# Patient Record
Sex: MALE | Race: WHITE
[De-identification: names, ages, dates, MRNs, and addresses within clinical notes are randomized per-mention and may not be internally consistent; named-entity substitution may affect disease eponyms.]

---

## 2020-10-15 VITALS — SYSTOLIC BLOOD PRESSURE: 124 MMHG | DIASTOLIC BLOOD PRESSURE: 90 MMHG

## 2020-10-15 LAB
BASOPHILS NFR BLD AUTO: 1.9 % (ref 0–5)
BUN SERPL-MCNC: 25 MG/DL (ref 7–18)
CHLORIDE SERPL-SCNC: 108 MMOL/L (ref 101–111)
CO2 SERPL-SCNC: 26 MMOL/L (ref 21–32)
CREAT SERPL-MCNC: 1.2 MG/DL (ref 0.5–1.5)
EOSINOPHIL NFR BLD AUTO: 1.9 % (ref 0–8)
ERYTHROCYTE [DISTWIDTH] IN BLOOD BY AUTOMATED COUNT: 21.3 % (ref 11–15.5)
GFR SERPL CREATININE-BSD FRML MDRD: 63 ML/MIN (ref 60–?)
GLUCOSE SERPL-MCNC: 91 MG/DL (ref 70–105)
HCT VFR BLD AUTO: 31.7 % (ref 42–54)
INR PPP: 0.93 (ref 0.85–1.15)
LYMPHOCYTES NFR SPEC AUTO: 34.2 % (ref 21–51)
MCH RBC QN AUTO: 24.7 PG (ref 27–33)
MCHC RBC AUTO-ENTMCNC: 30.6 G/DL (ref 32–36)
MCV RBC AUTO: 80.9 FL (ref 79–99)
MONOCYTES NFR BLD AUTO: 11.9 % (ref 3–13)
NEUTROPHILS NFR BLD AUTO: 49.9 % (ref 40–77)
NRBC BLD MANUAL-RTO: 0 % (ref 0–0.19)
PLATELET # BLD AUTO: 321 K/UL (ref 130–400)
POTASSIUM SERPL-SCNC: 4.2 MMOL/L (ref 3.5–5.1)
PROTHROMBIN TIME: 10.1 SEC (ref 9.6–11.6)
RBC # BLD AUTO: 3.92 MIL/UL (ref 4.5–6.2)
RBC MORPH BLD: (no result)
SODIUM SERPL-SCNC: 145 MMOL/L (ref 136–145)
WBC # BLD AUTO: 5.3 K/UL (ref 4.8–10.8)

## 2020-10-15 NOTE — NUR
report



called dr rodriguez and informed pt refusing covid test and ua.  received new orders to cancel 
ua and ok to obtain rapid covid instead of covid pcr.  dr reilly also notified of pt 
refusal to have covid test done.

## 2020-10-16 NOTE — NUR
RE: ABNORMAL EKG



REPORTED ABNORMAL EKG TO DR COATS, PER DR COATS RECOMMENDS GO TO THE EMERGENCY ROOM. 

CALLED PATIENT AND SPOKE WITH RICA CERVANTES (FRIEND). TOLD HER THAT PATIENT NEEDS TO BE 
TAKEN TO EMERGENCY ROOM DUE TO ABNORMAL EKG. PER, PATIENT'S FRIEND (RICA) PATIENT DRINKS 
HALF A GALLON OF ALCOHOL DAILY AND WILL NOT BE SOBER FOR HIS SURGERY ON MONDAY. SHE WILL 
TAKE HIM TO ER FIRST THING IN THE MORNING.

## 2020-10-16 NOTE — NUR
RE: TACHYCARDIA



CALLED PATIENT'S PROVIDER/ FRIEND, RICA. NO ANSWER BUT LEFT MESSAGE FOR PROVIDER TO MAKE 
SURE PATIENT TOOK HIS METOPROLOL TODAY AND TO GIVE HIM 1 TABLET TONIGHT IF POSSIBLE.

## 2020-10-16 NOTE — NUR
RE: ABNORMAL ABS



REPORTED HGB 9.7, HCT 31.7 TO DR DIOR AND INFORMED HIM THAT PATIENT WAS INSTRUCTED TO GO TO 
ER FOR ABNORMAL EKG. DR DIOR AWARE OF PATIENT'S ALCOHOLISM AND INSTRUCTED TO CALL PATIENT'S 
PROVIDER/FRIEND (RICA) AND INFORM HER TO MAKE SURE PATIENT TOOK HIS METOPROLOL TODAY AND TO 
TAKE 1 TABLET TONIGHT IF POSSIBLE FOR HIS TACHYCARDIA.

## 2020-10-19 ENCOUNTER — HOSPITAL ENCOUNTER (OUTPATIENT)
Dept: HOSPITAL 90 - DAHIP | Age: 70
Setting detail: OBSERVATION
LOS: 1 days | Discharge: HOME | End: 2020-10-20
Attending: ORTHOPAEDIC SURGERY | Admitting: ORTHOPAEDIC SURGERY
Payer: MEDICARE

## 2020-10-19 VITALS — DIASTOLIC BLOOD PRESSURE: 88 MMHG | SYSTOLIC BLOOD PRESSURE: 114 MMHG

## 2020-10-19 VITALS — DIASTOLIC BLOOD PRESSURE: 71 MMHG | SYSTOLIC BLOOD PRESSURE: 101 MMHG

## 2020-10-19 VITALS — SYSTOLIC BLOOD PRESSURE: 117 MMHG | DIASTOLIC BLOOD PRESSURE: 90 MMHG

## 2020-10-19 VITALS — DIASTOLIC BLOOD PRESSURE: 100 MMHG | SYSTOLIC BLOOD PRESSURE: 13 MMHG

## 2020-10-19 VITALS — HEIGHT: 69 IN | BODY MASS INDEX: 26.36 KG/M2 | WEIGHT: 178 LBS

## 2020-10-19 VITALS — SYSTOLIC BLOOD PRESSURE: 105 MMHG | DIASTOLIC BLOOD PRESSURE: 82 MMHG

## 2020-10-19 VITALS — SYSTOLIC BLOOD PRESSURE: 113 MMHG | DIASTOLIC BLOOD PRESSURE: 84 MMHG

## 2020-10-19 VITALS — SYSTOLIC BLOOD PRESSURE: 117 MMHG | DIASTOLIC BLOOD PRESSURE: 82 MMHG

## 2020-10-19 VITALS — DIASTOLIC BLOOD PRESSURE: 80 MMHG | SYSTOLIC BLOOD PRESSURE: 113 MMHG

## 2020-10-19 VITALS — DIASTOLIC BLOOD PRESSURE: 79 MMHG | SYSTOLIC BLOOD PRESSURE: 117 MMHG

## 2020-10-19 VITALS — DIASTOLIC BLOOD PRESSURE: 92 MMHG | SYSTOLIC BLOOD PRESSURE: 121 MMHG

## 2020-10-19 VITALS — DIASTOLIC BLOOD PRESSURE: 83 MMHG | SYSTOLIC BLOOD PRESSURE: 110 MMHG

## 2020-10-19 VITALS — DIASTOLIC BLOOD PRESSURE: 85 MMHG | SYSTOLIC BLOOD PRESSURE: 111 MMHG

## 2020-10-19 VITALS — DIASTOLIC BLOOD PRESSURE: 81 MMHG | SYSTOLIC BLOOD PRESSURE: 110 MMHG

## 2020-10-19 VITALS — DIASTOLIC BLOOD PRESSURE: 64 MMHG | SYSTOLIC BLOOD PRESSURE: 87 MMHG

## 2020-10-19 VITALS — SYSTOLIC BLOOD PRESSURE: 103 MMHG | DIASTOLIC BLOOD PRESSURE: 74 MMHG

## 2020-10-19 VITALS — DIASTOLIC BLOOD PRESSURE: 82 MMHG | SYSTOLIC BLOOD PRESSURE: 110 MMHG

## 2020-10-19 VITALS — SYSTOLIC BLOOD PRESSURE: 77 MMHG | DIASTOLIC BLOOD PRESSURE: 56 MMHG

## 2020-10-19 VITALS — DIASTOLIC BLOOD PRESSURE: 81 MMHG | SYSTOLIC BLOOD PRESSURE: 109 MMHG

## 2020-10-19 VITALS — SYSTOLIC BLOOD PRESSURE: 118 MMHG | DIASTOLIC BLOOD PRESSURE: 88 MMHG

## 2020-10-19 VITALS — DIASTOLIC BLOOD PRESSURE: 83 MMHG | SYSTOLIC BLOOD PRESSURE: 114 MMHG

## 2020-10-19 VITALS — DIASTOLIC BLOOD PRESSURE: 61 MMHG | SYSTOLIC BLOOD PRESSURE: 88 MMHG

## 2020-10-19 VITALS — SYSTOLIC BLOOD PRESSURE: 111 MMHG | DIASTOLIC BLOOD PRESSURE: 78 MMHG

## 2020-10-19 VITALS — SYSTOLIC BLOOD PRESSURE: 108 MMHG | DIASTOLIC BLOOD PRESSURE: 79 MMHG

## 2020-10-19 VITALS — SYSTOLIC BLOOD PRESSURE: 117 MMHG | DIASTOLIC BLOOD PRESSURE: 78 MMHG
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
## 2020-10-19 DIAGNOSIS — S42.491A: Primary | ICD-10-CM
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
## 2020-10-19 PROCEDURE — 24546 OPTX HUM FX W/NTRCNDYLR XTN: CPT

## 2020-10-19 PROCEDURE — 96375 TX/PRO/DX INJ NEW DRUG ADDON: CPT

## 2020-10-19 PROCEDURE — 85025 COMPLETE CBC W/AUTO DIFF WBC: CPT

## 2020-10-19 PROCEDURE — 94664 DEMO&/EVAL PT USE INHALER: CPT

## 2020-10-19 PROCEDURE — 36415 COLL VENOUS BLD VENIPUNCTURE: CPT

## 2020-10-19 PROCEDURE — 73080 X-RAY EXAM OF ELBOW: CPT

## 2020-10-19 PROCEDURE — 85610 PROTHROMBIN TIME: CPT

## 2020-10-19 PROCEDURE — 93005 ELECTROCARDIOGRAM TRACING: CPT

## 2020-10-19 PROCEDURE — 80048 BASIC METABOLIC PNL TOTAL CA: CPT

## 2020-10-19 PROCEDURE — 96374 THER/PROPH/DIAG INJ IV PUSH: CPT

## 2020-10-19 RX ADMIN — CHLORTHALIDONE SCH EACH: 50 TABLET ORAL at 21:00

## 2020-10-19 RX ADMIN — SODIUM CHLORIDE SCH MLS/HR: 0.9 INJECTION, SOLUTION INTRAVENOUS at 19:27

## 2020-10-19 RX ADMIN — FAMOTIDINE SCH MG: 20 TABLET, FILM COATED ORAL at 19:34

## 2020-10-19 RX ADMIN — SODIUM CHLORIDE SCH GM: 9 INJECTION, SOLUTION INTRAVENOUS at 17:30

## 2020-10-19 RX ADMIN — CELECOXIB SCH MG: 200 CAPSULE ORAL at 19:34

## 2020-10-19 RX ADMIN — ACETAMINOPHEN SCH MG: 500 TABLET, COATED ORAL at 19:34

## 2020-10-20 VITALS — SYSTOLIC BLOOD PRESSURE: 108 MMHG | DIASTOLIC BLOOD PRESSURE: 77 MMHG

## 2020-10-20 VITALS — DIASTOLIC BLOOD PRESSURE: 82 MMHG | SYSTOLIC BLOOD PRESSURE: 108 MMHG

## 2020-10-20 RX ADMIN — CHLORTHALIDONE SCH EACH: 50 TABLET ORAL at 09:00

## 2020-10-20 RX ADMIN — OXYCODONE HYDROCHLORIDE PRN MG: 5 TABLET ORAL at 08:33

## 2020-10-20 RX ADMIN — CELECOXIB SCH MG: 200 CAPSULE ORAL at 08:23

## 2020-10-20 RX ADMIN — ACETAMINOPHEN SCH MG: 500 TABLET, COATED ORAL at 09:30

## 2020-10-20 RX ADMIN — SODIUM CHLORIDE SCH MLS/HR: 0.9 INJECTION, SOLUTION INTRAVENOUS at 03:30

## 2020-10-20 RX ADMIN — OXYCODONE HYDROCHLORIDE PRN MG: 5 TABLET ORAL at 11:22

## 2020-10-20 RX ADMIN — FAMOTIDINE SCH MG: 20 TABLET, FILM COATED ORAL at 08:24

## 2020-10-20 RX ADMIN — ACETAMINOPHEN SCH MG: 500 TABLET, COATED ORAL at 00:48

## 2020-10-20 RX ADMIN — SODIUM CHLORIDE SCH GM: 9 INJECTION, SOLUTION INTRAVENOUS at 00:47

## 2020-10-20 NOTE — NUR
NO VOID

PATIENT UNABLE TO VOID BUT REFUSES TO BE BLADDER SCANNED.  PATIENT TOLD RISKS AND BENEFITS 
OF SCANNING AND NOT VOIDING.  HE STILL DID NOT WANT TO BE SCANNED STATES HE WILL VOID ON HIS 
OWN.

## 2020-10-20 NOTE — NUR
DISCHARGE

PATIENT/ELOISA GIVEN DISCHARGE INSTRUCTIONS VIA TEACH BACK. 20G PIV TO LEFT HAND 
DISCONTINUE, TIP INTACT. INSTRUCTIONS GIVEN FOR MEDICATIONS, APPOINTMENTS, EXERCISES TO 
RIGHT ARM, WHEN TO WEAR SLING AND KEEP DRESSING DRY AND INTACT. PATIENT TO FOLLOW UP WITH 
DR. DIOR ON 10/28/20 AT 0930. ELOISA (SANTI) GIVEN INSTRUCTIONS. PATIENT STABLE AT THIS 
TIME AND ANXIOUS TO GO HOME. PATIENT WHEELED TO [facility] BY ETHEL RODRIGUEZ ACCOMPANIED BY 
ELOISA.

## 2020-10-21 ENCOUNTER — HOSPITAL ENCOUNTER (INPATIENT)
Dept: HOSPITAL 90 - EDH | Age: 70
LOS: 4 days | Discharge: HOME HEALTH | DRG: 309 | End: 2020-10-25
Attending: INTERNAL MEDICINE | Admitting: INTERNAL MEDICINE
Payer: MEDICARE

## 2020-10-21 VITALS — WEIGHT: 173.5 LBS | HEIGHT: 70 IN | BODY MASS INDEX: 24.84 KG/M2

## 2020-10-21 VITALS — DIASTOLIC BLOOD PRESSURE: 74 MMHG | SYSTOLIC BLOOD PRESSURE: 110 MMHG

## 2020-10-21 VITALS — DIASTOLIC BLOOD PRESSURE: 85 MMHG | SYSTOLIC BLOOD PRESSURE: 116 MMHG

## 2020-10-21 VITALS — SYSTOLIC BLOOD PRESSURE: 112 MMHG | DIASTOLIC BLOOD PRESSURE: 90 MMHG

## 2020-10-21 VITALS — DIASTOLIC BLOOD PRESSURE: 65 MMHG | SYSTOLIC BLOOD PRESSURE: 145 MMHG

## 2020-10-21 DIAGNOSIS — N17.9: ICD-10-CM

## 2020-10-21 DIAGNOSIS — Z82.49: ICD-10-CM

## 2020-10-21 DIAGNOSIS — R71.0: ICD-10-CM

## 2020-10-21 DIAGNOSIS — I48.4: ICD-10-CM

## 2020-10-21 DIAGNOSIS — Z83.3: ICD-10-CM

## 2020-10-21 DIAGNOSIS — K44.9: ICD-10-CM

## 2020-10-21 DIAGNOSIS — I10: ICD-10-CM

## 2020-10-21 DIAGNOSIS — I48.91: Primary | ICD-10-CM

## 2020-10-21 LAB
ALBUMIN SERPL-MCNC: 3 G/DL (ref 3.5–5)
ALT SERPL-CCNC: 10 U/L (ref 12–78)
AST SERPL-CCNC: 21 U/L (ref 10–37)
BASOPHILS NFR BLD AUTO: 0.8 % (ref 0–5)
BILIRUB SERPL-MCNC: 0.3 MG/DL (ref 0.2–1)
BUN SERPL-MCNC: 35 MG/DL (ref 7–18)
CHLORIDE SERPL-SCNC: 101 MMOL/L (ref 101–111)
CO2 SERPL-SCNC: 22 MMOL/L (ref 21–32)
CREAT SERPL-MCNC: 1.5 MG/DL (ref 0.5–1.5)
EOSINOPHIL NFR BLD AUTO: 0.7 % (ref 0–8)
ERYTHROCYTE [DISTWIDTH] IN BLOOD BY AUTOMATED COUNT: 21.1 % (ref 11–15.5)
GFR SERPL CREATININE-BSD FRML MDRD: 49 ML/MIN (ref 60–?)
GLUCOSE SERPL-MCNC: 106 MG/DL (ref 70–105)
HCT VFR BLD AUTO: 26.8 % (ref 42–54)
LYMPHOCYTES NFR SPEC AUTO: 20.7 % (ref 21–51)
MCH RBC QN AUTO: 24.6 PG (ref 27–33)
MCHC RBC AUTO-ENTMCNC: 30.6 G/DL (ref 32–36)
MCV RBC AUTO: 80.5 FL (ref 79–99)
MONOCYTES NFR BLD AUTO: 10.5 % (ref 3–13)
NEUTROPHILS NFR BLD AUTO: 67 % (ref 40–77)
NRBC BLD MANUAL-RTO: 0 % (ref 0–0.19)
PLATELET # BLD AUTO: 263 K/UL (ref 130–400)
POTASSIUM SERPL-SCNC: 4.1 MMOL/L (ref 3.5–5.1)
PROT SERPL-MCNC: 6.4 G/DL (ref 6–8.3)
RBC # BLD AUTO: 3.33 MIL/UL (ref 4.5–6.2)
SODIUM SERPL-SCNC: 135 MMOL/L (ref 136–145)
WBC # BLD AUTO: 7.4 K/UL (ref 4.8–10.8)

## 2020-10-21 PROCEDURE — 85045 AUTOMATED RETICULOCYTE COUNT: CPT

## 2020-10-21 PROCEDURE — 74176 CT ABD & PELVIS W/O CONTRAST: CPT

## 2020-10-21 PROCEDURE — 93356 MYOCRD STRAIN IMG SPCKL TRCK: CPT

## 2020-10-21 PROCEDURE — 36415 COLL VENOUS BLD VENIPUNCTURE: CPT

## 2020-10-21 PROCEDURE — 80048 BASIC METABOLIC PNL TOTAL CA: CPT

## 2020-10-21 PROCEDURE — 85027 COMPLETE CBC AUTOMATED: CPT

## 2020-10-21 PROCEDURE — 83550 IRON BINDING TEST: CPT

## 2020-10-21 PROCEDURE — 93005 ELECTROCARDIOGRAM TRACING: CPT

## 2020-10-21 PROCEDURE — 82270 OCCULT BLOOD FECES: CPT

## 2020-10-21 PROCEDURE — 80053 COMPREHEN METABOLIC PANEL: CPT

## 2020-10-21 PROCEDURE — 84443 ASSAY THYROID STIM HORMONE: CPT

## 2020-10-21 PROCEDURE — 93306 TTE W/DOPPLER COMPLETE: CPT

## 2020-10-21 PROCEDURE — 85025 COMPLETE CBC W/AUTO DIFF WBC: CPT

## 2020-10-21 PROCEDURE — 83540 ASSAY OF IRON: CPT

## 2020-10-21 RX ADMIN — CHLORTHALIDONE SCH EACH: 50 TABLET ORAL at 21:18

## 2020-10-21 RX ADMIN — HYDROCODONE BITARTRATE AND ACETAMINOPHEN PRN TAB: 5; 325 TABLET ORAL at 21:22

## 2020-10-21 RX ADMIN — Medication SCH TAB: at 21:18

## 2020-10-22 VITALS — SYSTOLIC BLOOD PRESSURE: 147 MMHG | DIASTOLIC BLOOD PRESSURE: 80 MMHG

## 2020-10-22 VITALS — DIASTOLIC BLOOD PRESSURE: 99 MMHG | SYSTOLIC BLOOD PRESSURE: 134 MMHG

## 2020-10-22 VITALS — DIASTOLIC BLOOD PRESSURE: 80 MMHG | SYSTOLIC BLOOD PRESSURE: 130 MMHG

## 2020-10-22 VITALS — SYSTOLIC BLOOD PRESSURE: 140 MMHG | DIASTOLIC BLOOD PRESSURE: 86 MMHG

## 2020-10-22 VITALS — SYSTOLIC BLOOD PRESSURE: 99 MMHG | DIASTOLIC BLOOD PRESSURE: 71 MMHG

## 2020-10-22 VITALS — DIASTOLIC BLOOD PRESSURE: 83 MMHG | SYSTOLIC BLOOD PRESSURE: 123 MMHG

## 2020-10-22 VITALS — SYSTOLIC BLOOD PRESSURE: 140 MMHG | DIASTOLIC BLOOD PRESSURE: 82 MMHG

## 2020-10-22 VITALS — DIASTOLIC BLOOD PRESSURE: 82 MMHG | SYSTOLIC BLOOD PRESSURE: 132 MMHG

## 2020-10-22 VITALS — DIASTOLIC BLOOD PRESSURE: 99 MMHG | SYSTOLIC BLOOD PRESSURE: 128 MMHG

## 2020-10-22 VITALS — SYSTOLIC BLOOD PRESSURE: 113 MMHG | DIASTOLIC BLOOD PRESSURE: 47 MMHG

## 2020-10-22 VITALS — DIASTOLIC BLOOD PRESSURE: 73 MMHG | SYSTOLIC BLOOD PRESSURE: 114 MMHG

## 2020-10-22 VITALS — DIASTOLIC BLOOD PRESSURE: 80 MMHG | SYSTOLIC BLOOD PRESSURE: 121 MMHG

## 2020-10-22 VITALS — SYSTOLIC BLOOD PRESSURE: 106 MMHG | DIASTOLIC BLOOD PRESSURE: 71 MMHG

## 2020-10-22 VITALS — DIASTOLIC BLOOD PRESSURE: 86 MMHG | SYSTOLIC BLOOD PRESSURE: 133 MMHG

## 2020-10-22 VITALS — SYSTOLIC BLOOD PRESSURE: 126 MMHG | DIASTOLIC BLOOD PRESSURE: 77 MMHG

## 2020-10-22 VITALS — SYSTOLIC BLOOD PRESSURE: 117 MMHG | DIASTOLIC BLOOD PRESSURE: 85 MMHG

## 2020-10-22 VITALS — SYSTOLIC BLOOD PRESSURE: 134 MMHG | DIASTOLIC BLOOD PRESSURE: 86 MMHG

## 2020-10-22 VITALS — SYSTOLIC BLOOD PRESSURE: 133 MMHG | DIASTOLIC BLOOD PRESSURE: 90 MMHG

## 2020-10-22 LAB
BUN SERPL-MCNC: 28 MG/DL (ref 7–18)
CHLORIDE SERPL-SCNC: 104 MMOL/L (ref 101–111)
CO2 SERPL-SCNC: 20 MMOL/L (ref 21–32)
CREAT SERPL-MCNC: 1.2 MG/DL (ref 0.5–1.5)
ERYTHROCYTE [DISTWIDTH] IN BLOOD BY AUTOMATED COUNT: 21 % (ref 11–15.5)
GFR SERPL CREATININE-BSD FRML MDRD: 63 ML/MIN (ref 60–?)
GLUCOSE SERPL-MCNC: 99 MG/DL (ref 70–105)
HCT VFR BLD AUTO: 25.5 % (ref 42–54)
IRON SATN MFR SERPL: 7 % (ref 30–44)
IRON SERPL-MCNC: 22 MCG/DL (ref 65–175)
MCH RBC QN AUTO: 24.7 PG (ref 27–33)
MCHC RBC AUTO-ENTMCNC: 30.6 G/DL (ref 32–36)
MCV RBC AUTO: 80.7 FL (ref 79–99)
NRBC BLD MANUAL-RTO: 0 % (ref 0–0.19)
PLATELET # BLD AUTO: 244 K/UL (ref 130–400)
POTASSIUM SERPL-SCNC: 3.7 MMOL/L (ref 3.5–5.1)
RBC # BLD AUTO: 3.16 MIL/UL (ref 4.5–6.2)
RETICS/RBC NFR AUTO: 1.62 % (ref 0.42–2.23)
SODIUM SERPL-SCNC: 137 MMOL/L (ref 136–145)
TIBC SERPL-MCNC: 314 MCG/DL (ref 250–450)
WBC # BLD AUTO: 6.2 K/UL (ref 4.8–10.8)

## 2020-10-22 PROCEDURE — B24BZZ4 ULTRASONOGRAPHY OF HEART WITH AORTA, TRANSESOPHAGEAL: ICD-10-PCS | Performed by: STUDENT IN AN ORGANIZED HEALTH CARE EDUCATION/TRAINING PROGRAM

## 2020-10-22 PROCEDURE — 5A2204Z RESTORATION OF CARDIAC RHYTHM, SINGLE: ICD-10-PCS | Performed by: STUDENT IN AN ORGANIZED HEALTH CARE EDUCATION/TRAINING PROGRAM

## 2020-10-22 RX ADMIN — HYDROCODONE BITARTRATE AND ACETAMINOPHEN PRN TAB: 5; 325 TABLET ORAL at 02:29

## 2020-10-22 RX ADMIN — CHLORTHALIDONE SCH EACH: 50 TABLET ORAL at 09:00

## 2020-10-22 RX ADMIN — CHLORTHALIDONE SCH EACH: 50 TABLET ORAL at 20:52

## 2020-10-22 RX ADMIN — Medication SCH MG: at 20:47

## 2020-10-22 RX ADMIN — HYDROCODONE BITARTRATE AND ACETAMINOPHEN PRN TAB: 5; 325 TABLET ORAL at 20:52

## 2020-10-22 RX ADMIN — ZOLPIDEM TARTRATE PRN MG: 5 TABLET, FILM COATED ORAL at 22:45

## 2020-10-22 RX ADMIN — Medication SCH TAB: at 09:00

## 2020-10-22 RX ADMIN — Medication SCH TAB: at 20:53

## 2020-10-22 NOTE — NUR
ASSESSMENT

ENCOUNTERED PT A&OX3, CALM COOPERATIVE BUT FORGETFUL, DOES NOT APPEAR TO BE IN ANY DISTRESS. 
PT DOES NEED REDIRECTION WITH ACTIVITIES SUCH AS UTILIZING URINAL INSTEAD OF AMBULATING TO 
BATHROOM AND UTILIZING CALL LIGHT FOR ASSISTANCE. PT IS NPO AND CORRECTLY IDENTIFIES 
PROCEDURES PLANNED FOR TODAY. CALL LIGHT WITHIN REACH.

## 2020-10-22 NOTE — NUR
CRISTOPHER & CARDIOVERSION

@ 0900 DR SOLANO, MARGOT CAVAZOS (2 D ECHO TECH) & CHITRA MAURICIO CRNA IN .  PLAN 
FOR CRISTOPHER & CARDIOVERSION W/ANESTHESIA @ BEDSIDE.  PT & ROOM PREPPED & READY FOR PROCEDURE.

@ 1000 PROCEDURE TIME OUT PERFORMED.  PT PLACED ON NRBM, SLIGHT LT LATERAL FOR PROCEDURE.  

@ 1012 ANESTHESIA & PROCEDURE START TIME.  DR SOLANO ATTEMPTED CRISTOPHER.  NO CARDIOVERSION DONE.

PROCEDURE END TIME @ 1015.

PT TOLERATED PROCEDURE WELL.  V/S & RECOVERY AS PER V/S & RECOVERY SPREADSHEET.

## 2020-10-23 VITALS — SYSTOLIC BLOOD PRESSURE: 119 MMHG | DIASTOLIC BLOOD PRESSURE: 73 MMHG

## 2020-10-23 VITALS — DIASTOLIC BLOOD PRESSURE: 92 MMHG | SYSTOLIC BLOOD PRESSURE: 131 MMHG

## 2020-10-23 VITALS — DIASTOLIC BLOOD PRESSURE: 85 MMHG | SYSTOLIC BLOOD PRESSURE: 124 MMHG

## 2020-10-23 VITALS — DIASTOLIC BLOOD PRESSURE: 71 MMHG | SYSTOLIC BLOOD PRESSURE: 105 MMHG

## 2020-10-23 VITALS — SYSTOLIC BLOOD PRESSURE: 123 MMHG | DIASTOLIC BLOOD PRESSURE: 83 MMHG

## 2020-10-23 VITALS — DIASTOLIC BLOOD PRESSURE: 86 MMHG | SYSTOLIC BLOOD PRESSURE: 119 MMHG

## 2020-10-23 LAB
ALBUMIN SERPL-MCNC: 2.7 G/DL (ref 3.5–5)
ALT SERPL-CCNC: 16 U/L (ref 12–78)
AST SERPL-CCNC: 23 U/L (ref 10–37)
BASOPHILS NFR BLD AUTO: 0.7 % (ref 0–5)
BILIRUB SERPL-MCNC: 0.4 MG/DL (ref 0.2–1)
BUN SERPL-MCNC: 19 MG/DL (ref 7–18)
CHLORIDE SERPL-SCNC: 104 MMOL/L (ref 101–111)
CO2 SERPL-SCNC: 19 MMOL/L (ref 21–32)
CREAT SERPL-MCNC: 0.8 MG/DL (ref 0.5–1.5)
EOSINOPHIL NFR BLD AUTO: 1.4 % (ref 0–8)
ERYTHROCYTE [DISTWIDTH] IN BLOOD BY AUTOMATED COUNT: 21 % (ref 11–15.5)
GFR SERPL CREATININE-BSD FRML MDRD: 101 ML/MIN (ref 60–?)
GLUCOSE SERPL-MCNC: 92 MG/DL (ref 70–105)
HCT VFR BLD AUTO: 25.1 % (ref 42–54)
LYMPHOCYTES NFR SPEC AUTO: 22 % (ref 21–51)
MCH RBC QN AUTO: 24.9 PG (ref 27–33)
MCHC RBC AUTO-ENTMCNC: 31.1 G/DL (ref 32–36)
MCV RBC AUTO: 80.2 FL (ref 79–99)
MONOCYTES NFR BLD AUTO: 14.2 % (ref 3–13)
NEUTROPHILS NFR BLD AUTO: 61.5 % (ref 40–77)
NRBC BLD MANUAL-RTO: 0 % (ref 0–0.19)
PLATELET # BLD AUTO: 242 K/UL (ref 130–400)
POTASSIUM SERPL-SCNC: 3.5 MMOL/L (ref 3.5–5.1)
PROT SERPL-MCNC: 6.1 G/DL (ref 6–8.3)
RBC # BLD AUTO: 3.13 MIL/UL (ref 4.5–6.2)
SODIUM SERPL-SCNC: 139 MMOL/L (ref 136–145)
WBC # BLD AUTO: 5.8 K/UL (ref 4.8–10.8)

## 2020-10-23 RX ADMIN — CHLORTHALIDONE SCH EACH: 50 TABLET ORAL at 20:48

## 2020-10-23 RX ADMIN — Medication SCH MG: at 20:47

## 2020-10-23 RX ADMIN — HYDROCODONE BITARTRATE AND ACETAMINOPHEN PRN TAB: 5; 325 TABLET ORAL at 18:19

## 2020-10-23 RX ADMIN — Medication SCH MG: at 08:55

## 2020-10-23 RX ADMIN — HYDROCODONE BITARTRATE AND ACETAMINOPHEN PRN TAB: 5; 325 TABLET ORAL at 18:25

## 2020-10-23 RX ADMIN — Medication SCH TAB: at 20:47

## 2020-10-23 RX ADMIN — HYDROCODONE BITARTRATE AND ACETAMINOPHEN PRN TAB: 5; 325 TABLET ORAL at 10:56

## 2020-10-23 RX ADMIN — Medication SCH MG: at 14:20

## 2020-10-23 RX ADMIN — Medication SCH MG: at 22:48

## 2020-10-23 RX ADMIN — ZOLPIDEM TARTRATE PRN MG: 5 TABLET, FILM COATED ORAL at 20:47

## 2020-10-23 RX ADMIN — CHLORTHALIDONE SCH EACH: 50 TABLET ORAL at 08:51

## 2020-10-23 RX ADMIN — Medication SCH MG: at 13:37

## 2020-10-23 RX ADMIN — Medication SCH TAB: at 08:55

## 2020-10-23 NOTE — NUR
PATIENT TIMES THREE HAS TURNED OFF CARDIZEM PUMP.  WHEN ASKING PATIENT WHY HE STATED I DONT 
WANT IT.  IMPORTANCE OF MEDICATION EXPLAINED TO PATIENT AND HE STATED HE DIDN'T CARE.  WILL 
CONTINUE TO MONITOR PUMP.

## 2020-10-23 NOTE — NUR
CARDIZEM DRIP

CARDIZEM DRIP WEANED OFF AS ORDERED PER DR AVINA.  HEART RATE 70-90, DENIES CHEST PAIN OR 
DISCOMFORT.

## 2020-10-23 NOTE — NUR
AM ASSESSMENT

PT AWAKE AND ALERT, STATES GOING HOME TODAY AMA IF NOT DISCHARGED.  MADE PT AWARE HEART RATE 
ELEVATED AND NEEDS TO HAVE RATE CONTROL  PT INSISTS ON GOING HOME.  CONTINUES ON JAC LOPEZ MD TO BE MADE AWARE OF ELEVATED RATE

## 2020-10-23 NOTE — NUR
PT MORE CALM, HEART RATE 100-110, CARDIZEM AT 15 CC/H.  REMINDED NOT TO GET OUT OF BED PER 
SELF, CALL LIGHT WITHIN REACH.

## 2020-10-23 NOTE — NUR
PATIENT PULLED IV OUT CONNECTED TO THE CARDIZEM.  WHEN ASKED WHY, PATIENT STATED HE WANTED 
TO AND THAT HE WAS A BAD PATIENT.  CARDIZEM RECONNECTED TO OTHER EXISTING IV AND AGAIN THE 
NEED FOR CARDIZEM WAS EXPLAINED TO PATIENT.

## 2020-10-23 NOTE — NUR
Spoke to patient's nuryse (Rachel Metty [phone]). As per meredith, patient lives alone, 
has no DME or home services, is independent with ADLs before this admission, and she will be 
assisting with transport upon discharge. Meredith states patient will be staying at her 
residence after discharge. CM to follow up.

## 2020-10-24 VITALS — SYSTOLIC BLOOD PRESSURE: 120 MMHG | DIASTOLIC BLOOD PRESSURE: 73 MMHG

## 2020-10-24 VITALS — SYSTOLIC BLOOD PRESSURE: 131 MMHG | DIASTOLIC BLOOD PRESSURE: 96 MMHG

## 2020-10-24 VITALS — DIASTOLIC BLOOD PRESSURE: 50 MMHG | SYSTOLIC BLOOD PRESSURE: 70 MMHG

## 2020-10-24 VITALS — DIASTOLIC BLOOD PRESSURE: 101 MMHG | SYSTOLIC BLOOD PRESSURE: 128 MMHG

## 2020-10-24 VITALS — SYSTOLIC BLOOD PRESSURE: 130 MMHG | DIASTOLIC BLOOD PRESSURE: 94 MMHG

## 2020-10-24 VITALS — DIASTOLIC BLOOD PRESSURE: 86 MMHG | SYSTOLIC BLOOD PRESSURE: 130 MMHG

## 2020-10-24 LAB
BASOPHILS NFR BLD AUTO: 0.8 % (ref 0–5)
BUN SERPL-MCNC: 18 MG/DL (ref 7–18)
CHLORIDE SERPL-SCNC: 104 MMOL/L (ref 101–111)
CO2 SERPL-SCNC: 21 MMOL/L (ref 21–32)
CREAT SERPL-MCNC: 1 MG/DL (ref 0.5–1.5)
EOSINOPHIL NFR BLD AUTO: 0.8 % (ref 0–8)
ERYTHROCYTE [DISTWIDTH] IN BLOOD BY AUTOMATED COUNT: 20.8 % (ref 11–15.5)
GFR SERPL CREATININE-BSD FRML MDRD: 78 ML/MIN (ref 60–?)
GLUCOSE SERPL-MCNC: 92 MG/DL (ref 70–105)
HCT VFR BLD AUTO: 25 % (ref 42–54)
LYMPHOCYTES NFR SPEC AUTO: 22.4 % (ref 21–51)
MCH RBC QN AUTO: 24.4 PG (ref 27–33)
MCHC RBC AUTO-ENTMCNC: 30 G/DL (ref 32–36)
MCV RBC AUTO: 81.2 FL (ref 79–99)
MONOCYTES NFR BLD AUTO: 16.8 % (ref 3–13)
NEUTROPHILS NFR BLD AUTO: 58.9 % (ref 40–77)
NRBC BLD MANUAL-RTO: 0 % (ref 0–0.19)
PLATELET # BLD AUTO: 283 K/UL (ref 130–400)
POTASSIUM SERPL-SCNC: 3.5 MMOL/L (ref 3.5–5.1)
RBC # BLD AUTO: 3.08 MIL/UL (ref 4.5–6.2)
SODIUM SERPL-SCNC: 139 MMOL/L (ref 136–145)
WBC # BLD AUTO: 6 K/UL (ref 4.8–10.8)

## 2020-10-24 RX ADMIN — Medication SCH MG: at 05:27

## 2020-10-24 RX ADMIN — CHLORTHALIDONE SCH EACH: 50 TABLET ORAL at 08:28

## 2020-10-24 RX ADMIN — APIXABAN SCH MG: 5 TABLET, FILM COATED ORAL at 08:19

## 2020-10-24 RX ADMIN — Medication SCH MG: at 13:40

## 2020-10-24 RX ADMIN — HYDROCODONE BITARTRATE AND ACETAMINOPHEN PRN TAB: 5; 325 TABLET ORAL at 19:15

## 2020-10-24 RX ADMIN — Medication SCH TAB: at 21:54

## 2020-10-24 RX ADMIN — Medication SCH MG: at 21:54

## 2020-10-24 RX ADMIN — Medication SCH TAB: at 08:18

## 2020-10-24 RX ADMIN — CHLORTHALIDONE SCH EACH: 50 TABLET ORAL at 21:00

## 2020-10-24 RX ADMIN — ZOLPIDEM TARTRATE PRN MG: 5 TABLET, FILM COATED ORAL at 23:41

## 2020-10-24 RX ADMIN — APIXABAN SCH MG: 5 TABLET, FILM COATED ORAL at 21:55

## 2020-10-24 RX ADMIN — Medication SCH MG: at 08:18

## 2020-10-24 NOTE — NUR
ATTEMPTED DISCHARGE 

Attempted to discharge patient based on Aishwarya SO'S orders. Daphney Melgar had concerns 
about patient's self care at home due to declining hemoglobin levels, fluctuating heart rate 
and activities of daily living due to right arm fracture. Bety also states that patient is 
forgetful and wouldn't be able to self administer his medications if sent home for self 
care. Bety insists on speaking with NP. 



Bety's concerns relayed to Aishwarya SO. NP states to hold discharge and will have a meeting 
with patient and bety tomorrow.

## 2020-10-25 VITALS — DIASTOLIC BLOOD PRESSURE: 94 MMHG | SYSTOLIC BLOOD PRESSURE: 125 MMHG

## 2020-10-25 VITALS — SYSTOLIC BLOOD PRESSURE: 93 MMHG | DIASTOLIC BLOOD PRESSURE: 55 MMHG

## 2020-10-25 VITALS — SYSTOLIC BLOOD PRESSURE: 139 MMHG | DIASTOLIC BLOOD PRESSURE: 87 MMHG

## 2020-10-25 VITALS — DIASTOLIC BLOOD PRESSURE: 100 MMHG | SYSTOLIC BLOOD PRESSURE: 141 MMHG

## 2020-10-25 VITALS — DIASTOLIC BLOOD PRESSURE: 80 MMHG | SYSTOLIC BLOOD PRESSURE: 130 MMHG

## 2020-10-25 LAB
BASOPHILS NFR BLD AUTO: 0.7 % (ref 0–5)
BUN SERPL-MCNC: 30 MG/DL (ref 7–18)
CHLORIDE SERPL-SCNC: 100 MMOL/L (ref 101–111)
CO2 SERPL-SCNC: 21 MMOL/L (ref 21–32)
CREAT SERPL-MCNC: 2.1 MG/DL (ref 0.5–1.5)
EOSINOPHIL NFR BLD AUTO: 0.5 % (ref 0–8)
ERYTHROCYTE [DISTWIDTH] IN BLOOD BY AUTOMATED COUNT: 20.9 % (ref 11–15.5)
GFR SERPL CREATININE-BSD FRML MDRD: 33 ML/MIN (ref 60–?)
GLUCOSE SERPL-MCNC: 118 MG/DL (ref 70–105)
HCT VFR BLD AUTO: 26.9 % (ref 42–54)
LYMPHOCYTES NFR SPEC AUTO: 21.6 % (ref 21–51)
MCH RBC QN AUTO: 24.8 PG (ref 27–33)
MCHC RBC AUTO-ENTMCNC: 30.5 G/DL (ref 32–36)
MCV RBC AUTO: 81.3 FL (ref 79–99)
MONOCYTES NFR BLD AUTO: 19.5 % (ref 3–13)
NEUTROPHILS NFR BLD AUTO: 57.6 % (ref 40–77)
NRBC BLD MANUAL-RTO: 0 % (ref 0–0.19)
PLATELET # BLD AUTO: 333 K/UL (ref 130–400)
POTASSIUM SERPL-SCNC: 4.1 MMOL/L (ref 3.5–5.1)
RBC # BLD AUTO: 3.31 MIL/UL (ref 4.5–6.2)
RBC MORPH BLD: (no result)
SODIUM SERPL-SCNC: 135 MMOL/L (ref 136–145)
WBC # BLD AUTO: 8.2 K/UL (ref 4.8–10.8)

## 2020-10-25 RX ADMIN — HYDROCODONE BITARTRATE AND ACETAMINOPHEN PRN TAB: 5; 325 TABLET ORAL at 08:36

## 2020-10-25 RX ADMIN — CHLORTHALIDONE SCH EACH: 50 TABLET ORAL at 08:37

## 2020-10-25 RX ADMIN — Medication SCH MG: at 14:32

## 2020-10-25 RX ADMIN — Medication SCH TAB: at 08:35

## 2020-10-25 RX ADMIN — Medication SCH MG: at 08:35

## 2020-10-25 RX ADMIN — Medication SCH MG: at 06:14

## 2020-10-25 NOTE — NUR
Patient alert and oriented to name, , and month but has moments of forgetfulness.  
Continues to ask same questions after they were answered within the last 5 min. Patient 
resting in bed. Sling was placed to the R arm.  Patient non compliant with sling. Patient R 
fingers pink in color and warm to touch. No c/o pain throughout the night.

## 2020-10-25 NOTE — NUR
CM NOTE

CM followed up with patient and bety in regards to discharge plan. CM offered short term 
SNF/rehab as possible d/c option. Patient declined placement at this time. Bety and 
patient discussed possibly arranging home health for medication administration assistance. 
Patient agreeable to plan. CM explained to patient and bety Ramos that orders will be 
needing to be signed by patient's PCP. Verbalized understanding. Patient gave CM verbal 
consent to speak to PCP office and also signed ELADIO to fax medical records in preparation of 
home health. CM spoke to [facility] with Dr. Quinteros after obtaining consent from 
patient. CM explained need for home health. CM received fax number to fax records. Devaughn LOPEZ agreed to order home health. States he will be speaking to patient and will see in 
office tomorrow. Bety Ramos agreed to assist patient with medication management while 
home health is being arranged. No other needs verbalized or identified. CM to fax clinical 
as requested. Nursing updated with above.

-------------------------------------------------------------------------------

Addendum: 10/25/20 at 1521 by VAMSI HERNADEZ 

-------------------------------------------------------------------------------

Amended: Links added.

## 2020-11-10 ENCOUNTER — HOSPITAL ENCOUNTER (INPATIENT)
Dept: HOSPITAL 90 - EDH | Age: 70
LOS: 7 days | Discharge: NURSING, REHAB OR LONG-TERM CARE | DRG: 501 | End: 2020-11-17
Attending: ORTHOPAEDIC SURGERY | Admitting: ORTHOPAEDIC SURGERY
Payer: MEDICARE

## 2020-11-10 VITALS — BODY MASS INDEX: 25.34 KG/M2 | HEIGHT: 70 IN | WEIGHT: 177 LBS

## 2020-11-10 VITALS — DIASTOLIC BLOOD PRESSURE: 76 MMHG | SYSTOLIC BLOOD PRESSURE: 122 MMHG

## 2020-11-10 DIAGNOSIS — I47.1: ICD-10-CM
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
## 2020-11-10 DIAGNOSIS — I12.9: ICD-10-CM

## 2020-11-10 DIAGNOSIS — D68.69: ICD-10-CM

## 2020-11-10 DIAGNOSIS — A49.01: ICD-10-CM

## 2020-11-10 DIAGNOSIS — N18.31: ICD-10-CM

## 2020-11-10 DIAGNOSIS — Z91.19: ICD-10-CM

## 2020-11-10 DIAGNOSIS — I48.20: ICD-10-CM
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
## 2020-11-10 DIAGNOSIS — Z79.01: ICD-10-CM
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
## 2020-11-10 DIAGNOSIS — I48.4: ICD-10-CM

## 2020-11-10 DIAGNOSIS — Z83.3: ICD-10-CM

## 2020-11-10 DIAGNOSIS — G62.9: ICD-10-CM
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
## 2020-11-10 DIAGNOSIS — R53.81: ICD-10-CM

## 2020-11-10 DIAGNOSIS — R29.6: ICD-10-CM

## 2020-11-10 DIAGNOSIS — D50.9: ICD-10-CM

## 2020-11-10 DIAGNOSIS — I48.91: ICD-10-CM

## 2020-11-10 DIAGNOSIS — Z91.81: ICD-10-CM

## 2020-11-10 DIAGNOSIS — T81.31XA: ICD-10-CM

## 2020-11-10 DIAGNOSIS — Z20.828: ICD-10-CM

## 2020-11-10 DIAGNOSIS — L03.90: ICD-10-CM

## 2020-11-10 DIAGNOSIS — D62: ICD-10-CM

## 2020-11-10 DIAGNOSIS — F10.239: ICD-10-CM

## 2020-11-10 DIAGNOSIS — T84.612A: Primary | ICD-10-CM

## 2020-11-10 LAB
BASOPHILS NFR BLD AUTO: 0.6 % (ref 0–5)
BUN SERPL-MCNC: 22 MG/DL (ref 7–18)
CHLORIDE SERPL-SCNC: 103 MMOL/L (ref 101–111)
CO2 SERPL-SCNC: 18 MMOL/L (ref 21–32)
CREAT SERPL-MCNC: 1.4 MG/DL (ref 0.5–1.5)
EOSINOPHIL NFR BLD AUTO: 1.9 % (ref 0–8)
ERYTHROCYTE [DISTWIDTH] IN BLOOD BY AUTOMATED COUNT: 19.3 % (ref 11–15.5)
GFR SERPL CREATININE-BSD FRML MDRD: 53 ML/MIN (ref 60–?)
GLUCOSE SERPL-MCNC: 93 MG/DL (ref 70–105)
HCT VFR BLD AUTO: 23.5 % (ref 42–54)
LYMPHOCYTES NFR SPEC AUTO: 10.9 % (ref 21–51)
MCH RBC QN AUTO: 24.1 PG (ref 27–33)
MCHC RBC AUTO-ENTMCNC: 30.2 G/DL (ref 32–36)
MCV RBC AUTO: 79.7 FL (ref 79–99)
MONOCYTES NFR BLD AUTO: 12.4 % (ref 3–13)
NEUTROPHILS NFR BLD AUTO: 73.8 % (ref 40–77)
NRBC BLD MANUAL-RTO: 0 % (ref 0–0.19)
PLATELET # BLD AUTO: 296 K/UL (ref 130–400)
POTASSIUM SERPL-SCNC: 4.7 MMOL/L (ref 3.5–5.1)
RBC # BLD AUTO: 2.95 MIL/UL (ref 4.5–6.2)
RBC MORPH BLD: (no result)
SODIUM SERPL-SCNC: 137 MMOL/L (ref 136–145)
WBC # BLD AUTO: 7.9 K/UL (ref 4.8–10.8)

## 2020-11-10 PROCEDURE — 87205 SMEAR GRAM STAIN: CPT

## 2020-11-10 PROCEDURE — 85730 THROMBOPLASTIN TIME PARTIAL: CPT

## 2020-11-10 PROCEDURE — 87077 CULTURE AEROBIC IDENTIFY: CPT

## 2020-11-10 PROCEDURE — 82746 ASSAY OF FOLIC ACID SERUM: CPT

## 2020-11-10 PROCEDURE — 85610 PROTHROMBIN TIME: CPT

## 2020-11-10 PROCEDURE — 85014 HEMATOCRIT: CPT

## 2020-11-10 PROCEDURE — 36415 COLL VENOUS BLD VENIPUNCTURE: CPT

## 2020-11-10 PROCEDURE — 80202 ASSAY OF VANCOMYCIN: CPT

## 2020-11-10 PROCEDURE — 87186 SC STD MICRODIL/AGAR DIL: CPT

## 2020-11-10 PROCEDURE — 85018 HEMOGLOBIN: CPT

## 2020-11-10 PROCEDURE — 83735 ASSAY OF MAGNESIUM: CPT

## 2020-11-10 PROCEDURE — 85027 COMPLETE CBC AUTOMATED: CPT

## 2020-11-10 PROCEDURE — 80076 HEPATIC FUNCTION PANEL: CPT

## 2020-11-10 PROCEDURE — 86923 COMPATIBILITY TEST ELECTRIC: CPT

## 2020-11-10 PROCEDURE — 87070 CULTURE OTHR SPECIMN AEROBIC: CPT

## 2020-11-10 PROCEDURE — 73080 X-RAY EXAM OF ELBOW: CPT

## 2020-11-10 PROCEDURE — 84443 ASSAY THYROID STIM HORMONE: CPT

## 2020-11-10 PROCEDURE — 82948 REAGENT STRIP/BLOOD GLUCOSE: CPT

## 2020-11-10 PROCEDURE — 84100 ASSAY OF PHOSPHORUS: CPT

## 2020-11-10 PROCEDURE — 86850 RBC ANTIBODY SCREEN: CPT

## 2020-11-10 PROCEDURE — 82607 VITAMIN B-12: CPT

## 2020-11-10 PROCEDURE — 87426 SARSCOV CORONAVIRUS AG IA: CPT

## 2020-11-10 PROCEDURE — 82140 ASSAY OF AMMONIA: CPT

## 2020-11-10 PROCEDURE — 85025 COMPLETE CBC W/AUTO DIFF WBC: CPT

## 2020-11-10 PROCEDURE — 80048 BASIC METABOLIC PNL TOTAL CA: CPT

## 2020-11-10 PROCEDURE — 86901 BLOOD TYPING SEROLOGIC RH(D): CPT

## 2020-11-10 PROCEDURE — 70450 CT HEAD/BRAIN W/O DYE: CPT

## 2020-11-10 PROCEDURE — 36430 TRANSFUSION BLD/BLD COMPNT: CPT

## 2020-11-10 PROCEDURE — 87076 CULTURE ANAEROBE IDENT EACH: CPT

## 2020-11-10 PROCEDURE — 93005 ELECTROCARDIOGRAM TRACING: CPT

## 2020-11-10 PROCEDURE — 86900 BLOOD TYPING SEROLOGIC ABO: CPT

## 2020-11-10 NOTE — NUR
SPOKE WITH SANTI [place], PATIENT'S , SHE WILL BRING ALL THE LIST OF HOME 
MEDICATIONS  OF THE PATIENT TOMORROW 11/09/2020.

## 2020-11-10 NOTE — NUR
JOSE NOTE/IA

UNABLE TO MET WITH PATIENT AT BEDSIDE. NEXT OF KIN CALLED, SANTI [place]. AS PER SANTI, SHE 
IS A GOOD FRIEND TO PATIENT AND NO LONGER HIS FIANCEE DUE TO HIS DRINKING PROBLEMS, WEDDING 
WAS CALLED OFF. AS PER SANTI, PATIENT LIVES ALONE, IS SEMI INDEPENDENT, HAS NO DME, UNABLE 
TO DRIVE AND RELIES ON HIS FRIENDS FOR RIDES. AS PER SANTI, DOES NOT FEEL LIKE PATIENT 
SHOULD LIKE ALONE, STATES HE WAS HAD "WAY TOO MANY FALLS WHERE HE HURTS HIMSELF" AND " HE 
CANT DO MUCH FOR HIMSELF ANYMORE". AS PER SANTI, STATES THAT LAST TIME HE WAS IN HOSPITAL, 
"HOME HEALTH WAS OFFERED TO HIM AS PART OF HIS SURGERY BUT HE REFUSED IT AND THAT IS WHY 
WOUND IS INFECTED AND NOT HEALING." PATIENT IS PENDING POSSIBLE SURGERY FOR WOUND DEHISCENCE 
WITH DR. DIOR. CM TO FOLLOW UP.  

-------------------------------------------------------------------------------

Addendum: 11/10/20 at 1605 by FELIX ENRIQUEZ RN CM

-------------------------------------------------------------------------------

Amended: Links added.

## 2020-11-10 NOTE — NUR
CORRECTION TO THE NOTE BELOW. SANTI, SAID SHE WILL BRING ALL THE LIST FO MEDICATIONS 
TOMORROW 11/11/2020 AS SHE CANNOT COME IN TONIGHT AND DROP IT IN THE HOSPITAL. CHARGE NURSE 
SAMUEL ALSO HAS BEEN NOTIFIED.

## 2020-11-11 VITALS — DIASTOLIC BLOOD PRESSURE: 76 MMHG | SYSTOLIC BLOOD PRESSURE: 102 MMHG

## 2020-11-11 VITALS — DIASTOLIC BLOOD PRESSURE: 77 MMHG | SYSTOLIC BLOOD PRESSURE: 113 MMHG

## 2020-11-11 VITALS — DIASTOLIC BLOOD PRESSURE: 69 MMHG | SYSTOLIC BLOOD PRESSURE: 129 MMHG

## 2020-11-11 VITALS — DIASTOLIC BLOOD PRESSURE: 76 MMHG | SYSTOLIC BLOOD PRESSURE: 124 MMHG

## 2020-11-11 VITALS — SYSTOLIC BLOOD PRESSURE: 111 MMHG | DIASTOLIC BLOOD PRESSURE: 66 MMHG

## 2020-11-11 VITALS — SYSTOLIC BLOOD PRESSURE: 114 MMHG | DIASTOLIC BLOOD PRESSURE: 75 MMHG

## 2020-11-11 VITALS — DIASTOLIC BLOOD PRESSURE: 77 MMHG | SYSTOLIC BLOOD PRESSURE: 111 MMHG

## 2020-11-11 VITALS — DIASTOLIC BLOOD PRESSURE: 72 MMHG | SYSTOLIC BLOOD PRESSURE: 114 MMHG

## 2020-11-11 VITALS — DIASTOLIC BLOOD PRESSURE: 80 MMHG | SYSTOLIC BLOOD PRESSURE: 115 MMHG

## 2020-11-11 VITALS — DIASTOLIC BLOOD PRESSURE: 72 MMHG | SYSTOLIC BLOOD PRESSURE: 107 MMHG

## 2020-11-11 VITALS — SYSTOLIC BLOOD PRESSURE: 95 MMHG | DIASTOLIC BLOOD PRESSURE: 77 MMHG

## 2020-11-11 VITALS — DIASTOLIC BLOOD PRESSURE: 75 MMHG | SYSTOLIC BLOOD PRESSURE: 112 MMHG

## 2020-11-11 VITALS — SYSTOLIC BLOOD PRESSURE: 131 MMHG | DIASTOLIC BLOOD PRESSURE: 73 MMHG

## 2020-11-11 VITALS — SYSTOLIC BLOOD PRESSURE: 106 MMHG | DIASTOLIC BLOOD PRESSURE: 71 MMHG

## 2020-11-11 VITALS — SYSTOLIC BLOOD PRESSURE: 116 MMHG | DIASTOLIC BLOOD PRESSURE: 76 MMHG

## 2020-11-11 VITALS — SYSTOLIC BLOOD PRESSURE: 112 MMHG | DIASTOLIC BLOOD PRESSURE: 73 MMHG

## 2020-11-11 VITALS — SYSTOLIC BLOOD PRESSURE: 96 MMHG | DIASTOLIC BLOOD PRESSURE: 70 MMHG

## 2020-11-11 VITALS — SYSTOLIC BLOOD PRESSURE: 115 MMHG | DIASTOLIC BLOOD PRESSURE: 77 MMHG

## 2020-11-11 VITALS — SYSTOLIC BLOOD PRESSURE: 126 MMHG | DIASTOLIC BLOOD PRESSURE: 85 MMHG

## 2020-11-11 VITALS — SYSTOLIC BLOOD PRESSURE: 118 MMHG | DIASTOLIC BLOOD PRESSURE: 77 MMHG

## 2020-11-11 VITALS — DIASTOLIC BLOOD PRESSURE: 70 MMHG | SYSTOLIC BLOOD PRESSURE: 109 MMHG

## 2020-11-11 PROCEDURE — 3E0T33Z INTRODUCTION OF ANTI-INFLAMMATORY INTO PERIPHERAL NERVES AND PLEXI, PERCUTANEOUS APPROACH: ICD-10-PCS | Performed by: ORTHOPAEDIC SURGERY

## 2020-11-11 PROCEDURE — 0RPL04Z REMOVAL OF INTERNAL FIXATION DEVICE FROM RIGHT ELBOW JOINT, OPEN APPROACH: ICD-10-PCS | Performed by: ORTHOPAEDIC SURGERY

## 2020-11-11 PROCEDURE — 0XBB0ZZ EXCISION OF RIGHT ELBOW REGION, OPEN APPROACH: ICD-10-PCS | Performed by: ORTHOPAEDIC SURGERY

## 2020-11-11 PROCEDURE — 3E0T3BZ INTRODUCTION OF ANESTHETIC AGENT INTO PERIPHERAL NERVES AND PLEXI, PERCUTANEOUS APPROACH: ICD-10-PCS | Performed by: ORTHOPAEDIC SURGERY

## 2020-11-11 PROCEDURE — 0RHL08Z INSERTION OF SPACER INTO RIGHT ELBOW JOINT, OPEN APPROACH: ICD-10-PCS | Performed by: ORTHOPAEDIC SURGERY

## 2020-11-11 RX ADMIN — Medication SCH MG: at 21:00

## 2020-11-11 RX ADMIN — CLINDAMYCIN PHOSPHATE SCH MLS/HR: 18 INJECTION, SOLUTION INTRAVENOUS at 08:28

## 2020-11-11 RX ADMIN — CLINDAMYCIN PHOSPHATE SCH MLS/HR: 18 INJECTION, SOLUTION INTRAVENOUS at 14:02

## 2020-11-11 RX ADMIN — VANCOMYCIN HYDROCHLORIDE SCH MLS/HR: 1 INJECTION, POWDER, LYOPHILIZED, FOR SOLUTION INTRAVENOUS at 21:00

## 2020-11-11 RX ADMIN — CEFTAZIDIME SCH GM: 170 INJECTION, POWDER, FOR SOLUTION INTRAMUSCULAR; INTRAVENOUS at 16:10

## 2020-11-11 RX ADMIN — CEFTAZIDIME SCH GM: 170 INJECTION, POWDER, FOR SOLUTION INTRAMUSCULAR; INTRAVENOUS at 09:53

## 2020-11-11 RX ADMIN — CHLORTHALIDONE SCH EACH: 50 TABLET ORAL at 21:00

## 2020-11-11 RX ADMIN — SODIUM CHLORIDE SCH MLS/HR: 0.9 INJECTION, SOLUTION INTRAVENOUS at 23:40

## 2020-11-11 RX ADMIN — SODIUM CHLORIDE SCH MLS/HR: 0.9 INJECTION, SOLUTION INTRAVENOUS at 14:25

## 2020-11-11 RX ADMIN — CLINDAMYCIN PHOSPHATE SCH MLS/HR: 18 INJECTION, SOLUTION INTRAVENOUS at 23:25

## 2020-11-11 RX ADMIN — Medication SCH MG: at 09:52

## 2020-11-11 RX ADMIN — HYDROCODONE BITARTRATE AND ACETAMINOPHEN PRN TAB: 5; 325 TABLET ORAL at 23:24

## 2020-11-11 RX ADMIN — CHLORTHALIDONE SCH EACH: 50 TABLET ORAL at 09:00

## 2020-11-11 RX ADMIN — VANCOMYCIN HYDROCHLORIDE SCH MLS/HR: 1 INJECTION, POWDER, LYOPHILIZED, FOR SOLUTION INTRAVENOUS at 09:53

## 2020-11-11 RX ADMIN — VANCOMYCIN HYDROCHLORIDE SCH MLS/HR: 1 INJECTION, POWDER, LYOPHILIZED, FOR SOLUTION INTRAVENOUS at 05:31

## 2020-11-11 RX ADMIN — SODIUM CHLORIDE SCH MLS/HR: 0.9 INJECTION, SOLUTION INTRAVENOUS at 01:03

## 2020-11-11 NOTE — NUR
PT SENT TO SURGERY,  PT STILL IN NEED OF 1 PRBC OF BLOOD AND THAT WAS REPORTED TO SURGERY 
HOLDING AREA NURSES.

## 2020-11-11 NOTE — NUR
MD FRNAK MARIE AT BEDSIDE WITH THE PATIENT. AFTER THOROUGH ASSESSMENT AND HISTORY TAKING, MD MARIE 
SAID HE WILL CLEAR PATIENT FOR SURGERY. MD MARIE NOTED HGB WAS LOW LAST NIGHT. HE ORDERED 
STOOL OCCULT, THAT WAS PENDING. INFORMED HUMBERTO PATRICIA ABOUT IT.

PATIENT ALERT ORIENTED X 4. PT DENIED ANY CHEST PAIN NOR SHORTNESS OF BREATHING. NO 
COMPLAINTS OF PAIN FOR NOW. INSTRUCTED NPO AFTER BREAKFAST. INFORMED MD MARIE, PT'S LIST OF 
HOME MEDICATION STILL PENDING TO BE BROUGHT BY SANTI TODAY, SO MD Marie SAID HE WILL 
RECONCILED SOME OF THE HOME MEDICATIONS THAT HE KNOWS PATIENT IS TAKING. INFORMED HUMBERTO PATRICIA TO 
FOLLOW UP WITH SANTI.

## 2020-11-11 NOTE — NUR
TRANSFER TO 3RD FLOOR



REPORT CALLED IN TO CONSUELO PAUL. THE PATIENT IS SCHEDULED FOR RIGHT ELBOW SURGERY, NPO SINCE 
AFTER BREAKFAST. CONSENT IS IN CHART. 1 UNIT OF PRBC HAS BEEN INFUSED 1 MORE PENDING.

## 2020-11-12 VITALS — DIASTOLIC BLOOD PRESSURE: 60 MMHG | SYSTOLIC BLOOD PRESSURE: 91 MMHG

## 2020-11-12 VITALS — DIASTOLIC BLOOD PRESSURE: 64 MMHG | SYSTOLIC BLOOD PRESSURE: 96 MMHG

## 2020-11-12 VITALS — SYSTOLIC BLOOD PRESSURE: 138 MMHG | DIASTOLIC BLOOD PRESSURE: 79 MMHG

## 2020-11-12 VITALS — DIASTOLIC BLOOD PRESSURE: 76 MMHG | SYSTOLIC BLOOD PRESSURE: 117 MMHG

## 2020-11-12 VITALS — SYSTOLIC BLOOD PRESSURE: 112 MMHG | DIASTOLIC BLOOD PRESSURE: 69 MMHG

## 2020-11-12 VITALS — DIASTOLIC BLOOD PRESSURE: 61 MMHG | SYSTOLIC BLOOD PRESSURE: 92 MMHG

## 2020-11-12 VITALS — SYSTOLIC BLOOD PRESSURE: 116 MMHG | DIASTOLIC BLOOD PRESSURE: 76 MMHG

## 2020-11-12 VITALS — SYSTOLIC BLOOD PRESSURE: 115 MMHG | DIASTOLIC BLOOD PRESSURE: 74 MMHG

## 2020-11-12 VITALS — SYSTOLIC BLOOD PRESSURE: 96 MMHG | DIASTOLIC BLOOD PRESSURE: 58 MMHG

## 2020-11-12 VITALS — SYSTOLIC BLOOD PRESSURE: 102 MMHG | DIASTOLIC BLOOD PRESSURE: 69 MMHG

## 2020-11-12 LAB
APTT PPP: 32.9 SEC (ref 26.3–35.5)
BUN SERPL-MCNC: 15 MG/DL (ref 7–18)
CHLORIDE SERPL-SCNC: 108 MMOL/L (ref 101–111)
CO2 SERPL-SCNC: 18 MMOL/L (ref 21–32)
CREAT SERPL-MCNC: 1.2 MG/DL (ref 0.5–1.5)
ERYTHROCYTE [DISTWIDTH] IN BLOOD BY AUTOMATED COUNT: 18.3 % (ref 11–15.5)
GFR SERPL CREATININE-BSD FRML MDRD: 63 ML/MIN (ref 60–?)
GLUCOSE SERPL-MCNC: 99 MG/DL (ref 70–105)
HCT VFR BLD AUTO: 22.6 % (ref 42–54)
HCT VFR BLD AUTO: 23.1 % (ref 42–54)
INR PPP: 1.15 (ref 0.85–1.15)
MAGNESIUM SERPL-MCNC: 1.2 MG/DL (ref 1.8–2.4)
MCH RBC QN AUTO: 25.4 PG (ref 27–33)
MCHC RBC AUTO-ENTMCNC: 30.7 G/DL (ref 32–36)
MCV RBC AUTO: 82.5 FL (ref 79–99)
NRBC BLD MANUAL-RTO: 0 % (ref 0–0.19)
PLATELET # BLD AUTO: 238 K/UL (ref 130–400)
POTASSIUM SERPL-SCNC: 4.5 MMOL/L (ref 3.5–5.1)
PROTHROMBIN TIME: 12.4 SEC (ref 9.6–11.6)
RBC # BLD AUTO: 2.8 MIL/UL (ref 4.5–6.2)
SODIUM SERPL-SCNC: 138 MMOL/L (ref 136–145)
WBC # BLD AUTO: 5.9 K/UL (ref 4.8–10.8)

## 2020-11-12 PROCEDURE — 05HY33Z INSERTION OF INFUSION DEVICE INTO UPPER VEIN, PERCUTANEOUS APPROACH: ICD-10-PCS | Performed by: INTERNAL MEDICINE

## 2020-11-12 PROCEDURE — 30233N1 TRANSFUSION OF NONAUTOLOGOUS RED BLOOD CELLS INTO PERIPHERAL VEIN, PERCUTANEOUS APPROACH: ICD-10-PCS | Performed by: INTERNAL MEDICINE

## 2020-11-12 RX ADMIN — HYDROCODONE BITARTRATE AND ACETAMINOPHEN PRN TAB: 5; 325 TABLET ORAL at 04:52

## 2020-11-12 RX ADMIN — MAGNESIUM SULFATE IN WATER PRN MLS/HR: 40 INJECTION, SOLUTION INTRAVENOUS at 09:50

## 2020-11-12 RX ADMIN — HYDROCODONE BITARTRATE AND ACETAMINOPHEN PRN TAB: 5; 325 TABLET ORAL at 12:34

## 2020-11-12 RX ADMIN — Medication SCH MG: at 08:57

## 2020-11-12 RX ADMIN — Medication SCH GM: at 08:55

## 2020-11-12 RX ADMIN — CEFTAZIDIME SCH GM: 170 INJECTION, POWDER, FOR SOLUTION INTRAMUSCULAR; INTRAVENOUS at 08:53

## 2020-11-12 RX ADMIN — Medication SCH MG: at 15:48

## 2020-11-12 RX ADMIN — FAMOTIDINE SCH MG: 20 TABLET, FILM COATED ORAL at 08:55

## 2020-11-12 RX ADMIN — Medication SCH MG: at 20:36

## 2020-11-12 RX ADMIN — FAMOTIDINE SCH MG: 20 TABLET, FILM COATED ORAL at 20:37

## 2020-11-12 RX ADMIN — Medication SCH MG: at 23:32

## 2020-11-12 RX ADMIN — CEFTAZIDIME SCH GM: 170 INJECTION, POWDER, FOR SOLUTION INTRAMUSCULAR; INTRAVENOUS at 00:00

## 2020-11-12 RX ADMIN — SODIUM CHLORIDE SCH MLS/HR: 0.9 INJECTION, SOLUTION INTRAVENOUS at 17:30

## 2020-11-12 RX ADMIN — VANCOMYCIN HYDROCHLORIDE SCH MLS/HR: 1 INJECTION, POWDER, LYOPHILIZED, FOR SOLUTION INTRAVENOUS at 08:53

## 2020-11-12 RX ADMIN — LORAZEPAM PRN MG: 2 INJECTION INTRAMUSCULAR; INTRAVENOUS at 15:52

## 2020-11-12 RX ADMIN — SODIUM CHLORIDE SCH MLS/HR: 0.9 INJECTION, SOLUTION INTRAVENOUS at 07:30

## 2020-11-12 RX ADMIN — CHLORTHALIDONE SCH EACH: 50 TABLET ORAL at 09:00

## 2020-11-12 RX ADMIN — Medication SCH MG: at 08:56

## 2020-11-12 RX ADMIN — CHLORTHALIDONE SCH EACH: 50 TABLET ORAL at 20:37

## 2020-11-12 RX ADMIN — CLINDAMYCIN PHOSPHATE SCH MLS/HR: 18 INJECTION, SOLUTION INTRAVENOUS at 06:00

## 2020-11-12 RX ADMIN — KETOROLAC TROMETHAMINE PRN MG: 15 INJECTION, SOLUTION INTRAMUSCULAR; INTRAVENOUS at 20:38

## 2020-11-12 RX ADMIN — TAMSULOSIN HYDROCHLORIDE SCH MG: 0.4 CAPSULE ORAL at 08:56

## 2020-11-12 RX ADMIN — SODIUM CHLORIDE SCH MLS/HR: 9 INJECTION, SOLUTION INTRAVENOUS at 20:37

## 2020-11-12 RX ADMIN — HYDROCODONE BITARTRATE AND ACETAMINOPHEN PRN TAB: 5; 325 TABLET ORAL at 08:57

## 2020-11-12 NOTE — NUR
PATIENT WITH C/O SEVERE PAIN STATING THAT THE "VICODIN 2 TABS PO ARE NOT CUTTING IT". 
MORPHINE PCA PROTOCOL INITIATED AS PER PRN ORDERS. 

PRIOR TO USE OF MORPHINE, PT CONSENTED TO PICC LINE PLACEMENT. CONSENT WITNESSED AND SIGNED 
BY PRIMARY NURSE YOANA PAUL AND CHARGE NURSE SAMUEL PAUL.

## 2020-11-12 NOTE — NUR
I HAVE SPOKEN TO DR ROLDAN OFFICE IN REGARDS TO NEW CONSULT, THEY STATED HE WAS HERE AT [facility] 
[place] NOW AND THEY WOULD SEND HIM A MESSAGE TO REMIND HIM;  I HAVE INFORMED DR BAUGH 
OF THIS

## 2020-11-12 NOTE — NUR
pt is confused and getting out of bed without calling for assistance;  he got out of bed to 
quickly to go to the restroom and accidentally pulled out his hv drain from right arm;  i 
have called dr rodriguez to let him know pending call back

## 2020-11-12 NOTE — NUR
PICC LINE INSERTED TO LEFT UPPER ARM, SOME BLEEDING POST PROCEDURE, PRESSURE DRESSING 
APPLIED, REPORT GIVEN TO ROSALVA RODRIGUEZ RN

## 2020-11-13 VITALS — DIASTOLIC BLOOD PRESSURE: 84 MMHG | SYSTOLIC BLOOD PRESSURE: 152 MMHG

## 2020-11-13 VITALS — SYSTOLIC BLOOD PRESSURE: 143 MMHG | DIASTOLIC BLOOD PRESSURE: 85 MMHG

## 2020-11-13 VITALS — DIASTOLIC BLOOD PRESSURE: 73 MMHG | SYSTOLIC BLOOD PRESSURE: 154 MMHG

## 2020-11-13 VITALS — DIASTOLIC BLOOD PRESSURE: 72 MMHG | SYSTOLIC BLOOD PRESSURE: 132 MMHG

## 2020-11-13 VITALS — DIASTOLIC BLOOD PRESSURE: 79 MMHG | SYSTOLIC BLOOD PRESSURE: 140 MMHG

## 2020-11-13 VITALS — SYSTOLIC BLOOD PRESSURE: 126 MMHG | DIASTOLIC BLOOD PRESSURE: 74 MMHG

## 2020-11-13 LAB
BASOPHILS NFR BLD AUTO: 1.1 % (ref 0–5)
BUN SERPL-MCNC: 13 MG/DL (ref 7–18)
CHLORIDE SERPL-SCNC: 107 MMOL/L (ref 101–111)
CO2 SERPL-SCNC: 18 MMOL/L (ref 21–32)
CREAT SERPL-MCNC: 1 MG/DL (ref 0.5–1.5)
EOSINOPHIL NFR BLD AUTO: 3.7 % (ref 0–8)
ERYTHROCYTE [DISTWIDTH] IN BLOOD BY AUTOMATED COUNT: 17.2 % (ref 11–15.5)
GFR SERPL CREATININE-BSD FRML MDRD: 78 ML/MIN (ref 60–?)
GLUCOSE SERPL-MCNC: 97 MG/DL (ref 70–105)
HCT VFR BLD AUTO: 27.8 % (ref 42–54)
LYMPHOCYTES NFR SPEC AUTO: 18.8 % (ref 21–51)
MAGNESIUM SERPL-MCNC: 1.5 MG/DL (ref 1.8–2.4)
MCH RBC QN AUTO: 26 PG (ref 27–33)
MCHC RBC AUTO-ENTMCNC: 31.7 G/DL (ref 32–36)
MCV RBC AUTO: 82.2 FL (ref 79–99)
MONOCYTES NFR BLD AUTO: 10.6 % (ref 3–13)
NEUTROPHILS NFR BLD AUTO: 65.3 % (ref 40–77)
NRBC BLD MANUAL-RTO: 0 % (ref 0–0.19)
PLATELET # BLD AUTO: 228 K/UL (ref 130–400)
POTASSIUM SERPL-SCNC: 4.6 MMOL/L (ref 3.5–5.1)
RBC # BLD AUTO: 3.38 MIL/UL (ref 4.5–6.2)
SODIUM SERPL-SCNC: 136 MMOL/L (ref 136–145)
WBC # BLD AUTO: 6.4 K/UL (ref 4.8–10.8)

## 2020-11-13 RX ADMIN — Medication SCH MG: at 07:29

## 2020-11-13 RX ADMIN — FAMOTIDINE SCH MG: 20 TABLET, FILM COATED ORAL at 10:56

## 2020-11-13 RX ADMIN — CHLORTHALIDONE SCH EACH: 50 TABLET ORAL at 19:47

## 2020-11-13 RX ADMIN — SODIUM CHLORIDE SCH MLS/HR: 9 INJECTION, SOLUTION INTRAVENOUS at 10:56

## 2020-11-13 RX ADMIN — CHLORTHALIDONE SCH EACH: 50 TABLET ORAL at 09:00

## 2020-11-13 RX ADMIN — Medication SCH MG: at 15:01

## 2020-11-13 RX ADMIN — Medication SCH MG: at 10:56

## 2020-11-13 RX ADMIN — TAMSULOSIN HYDROCHLORIDE SCH MG: 0.4 CAPSULE ORAL at 09:00

## 2020-11-13 RX ADMIN — Medication SCH MG: at 19:44

## 2020-11-13 RX ADMIN — Medication SCH MG: at 09:00

## 2020-11-13 RX ADMIN — THERA TABS SCH TAB: TAB at 10:56

## 2020-11-13 RX ADMIN — SODIUM CHLORIDE SCH GM: 9 INJECTION, SOLUTION INTRAVENOUS at 15:02

## 2020-11-13 RX ADMIN — LORAZEPAM PRN MG: 2 INJECTION INTRAMUSCULAR; INTRAVENOUS at 19:45

## 2020-11-13 RX ADMIN — Medication SCH GM: at 10:56

## 2020-11-13 RX ADMIN — Medication SCH MG: at 21:56

## 2020-11-13 RX ADMIN — SODIUM CHLORIDE SCH GM: 9 INJECTION, SOLUTION INTRAVENOUS at 21:56

## 2020-11-13 RX ADMIN — LORAZEPAM PRN MG: 2 INJECTION INTRAMUSCULAR; INTRAVENOUS at 07:33

## 2020-11-13 RX ADMIN — FAMOTIDINE SCH MG: 20 TABLET, FILM COATED ORAL at 19:44

## 2020-11-13 RX ADMIN — HYDROCODONE BITARTRATE AND ACETAMINOPHEN PRN TAB: 5; 325 TABLET ORAL at 15:03

## 2020-11-14 VITALS — DIASTOLIC BLOOD PRESSURE: 86 MMHG | SYSTOLIC BLOOD PRESSURE: 153 MMHG

## 2020-11-14 VITALS — SYSTOLIC BLOOD PRESSURE: 160 MMHG | DIASTOLIC BLOOD PRESSURE: 109 MMHG

## 2020-11-14 VITALS — SYSTOLIC BLOOD PRESSURE: 150 MMHG | DIASTOLIC BLOOD PRESSURE: 91 MMHG

## 2020-11-14 VITALS — SYSTOLIC BLOOD PRESSURE: 160 MMHG | DIASTOLIC BLOOD PRESSURE: 98 MMHG

## 2020-11-14 VITALS — DIASTOLIC BLOOD PRESSURE: 94 MMHG | SYSTOLIC BLOOD PRESSURE: 156 MMHG

## 2020-11-14 VITALS — SYSTOLIC BLOOD PRESSURE: 141 MMHG | DIASTOLIC BLOOD PRESSURE: 113 MMHG

## 2020-11-14 VITALS — DIASTOLIC BLOOD PRESSURE: 95 MMHG | SYSTOLIC BLOOD PRESSURE: 146 MMHG

## 2020-11-14 VITALS — DIASTOLIC BLOOD PRESSURE: 89 MMHG | SYSTOLIC BLOOD PRESSURE: 153 MMHG

## 2020-11-14 VITALS — DIASTOLIC BLOOD PRESSURE: 68 MMHG | SYSTOLIC BLOOD PRESSURE: 137 MMHG

## 2020-11-14 VITALS — DIASTOLIC BLOOD PRESSURE: 86 MMHG | SYSTOLIC BLOOD PRESSURE: 156 MMHG

## 2020-11-14 VITALS — SYSTOLIC BLOOD PRESSURE: 178 MMHG | DIASTOLIC BLOOD PRESSURE: 99 MMHG

## 2020-11-14 VITALS — SYSTOLIC BLOOD PRESSURE: 162 MMHG | DIASTOLIC BLOOD PRESSURE: 94 MMHG

## 2020-11-14 VITALS — SYSTOLIC BLOOD PRESSURE: 144 MMHG | DIASTOLIC BLOOD PRESSURE: 78 MMHG

## 2020-11-14 VITALS — SYSTOLIC BLOOD PRESSURE: 144 MMHG | DIASTOLIC BLOOD PRESSURE: 111 MMHG

## 2020-11-14 VITALS — DIASTOLIC BLOOD PRESSURE: 85 MMHG | SYSTOLIC BLOOD PRESSURE: 153 MMHG

## 2020-11-14 VITALS — DIASTOLIC BLOOD PRESSURE: 61 MMHG | SYSTOLIC BLOOD PRESSURE: 124 MMHG

## 2020-11-14 VITALS — DIASTOLIC BLOOD PRESSURE: 99 MMHG | SYSTOLIC BLOOD PRESSURE: 152 MMHG

## 2020-11-14 VITALS — SYSTOLIC BLOOD PRESSURE: 179 MMHG | DIASTOLIC BLOOD PRESSURE: 101 MMHG

## 2020-11-14 VITALS — SYSTOLIC BLOOD PRESSURE: 153 MMHG | DIASTOLIC BLOOD PRESSURE: 87 MMHG

## 2020-11-14 VITALS — DIASTOLIC BLOOD PRESSURE: 85 MMHG | SYSTOLIC BLOOD PRESSURE: 158 MMHG

## 2020-11-14 VITALS — DIASTOLIC BLOOD PRESSURE: 112 MMHG | SYSTOLIC BLOOD PRESSURE: 173 MMHG

## 2020-11-14 VITALS — SYSTOLIC BLOOD PRESSURE: 154 MMHG | DIASTOLIC BLOOD PRESSURE: 85 MMHG

## 2020-11-14 VITALS — DIASTOLIC BLOOD PRESSURE: 83 MMHG | SYSTOLIC BLOOD PRESSURE: 150 MMHG

## 2020-11-14 VITALS — SYSTOLIC BLOOD PRESSURE: 151 MMHG | DIASTOLIC BLOOD PRESSURE: 83 MMHG

## 2020-11-14 VITALS — DIASTOLIC BLOOD PRESSURE: 87 MMHG | SYSTOLIC BLOOD PRESSURE: 145 MMHG

## 2020-11-14 VITALS — DIASTOLIC BLOOD PRESSURE: 89 MMHG | SYSTOLIC BLOOD PRESSURE: 156 MMHG

## 2020-11-14 LAB
ALBUMIN SERPL-MCNC: 2.1 G/DL (ref 3.5–5)
ALT SERPL-CCNC: 16 U/L (ref 12–78)
AST SERPL-CCNC: 16 U/L (ref 10–37)
BILIRUB DIRECT SERPL-MCNC: 0.2 MG/DL (ref 0–0.3)
BILIRUB SERPL-MCNC: 0.5 MG/DL (ref 0.2–1)
BUN SERPL-MCNC: 9 MG/DL (ref 7–18)
CHLORIDE SERPL-SCNC: 104 MMOL/L (ref 101–111)
CO2 SERPL-SCNC: 18 MMOL/L (ref 21–32)
CREAT SERPL-MCNC: 0.8 MG/DL (ref 0.5–1.5)
ERYTHROCYTE [DISTWIDTH] IN BLOOD BY AUTOMATED COUNT: 17.6 % (ref 11–15.5)
GFR SERPL CREATININE-BSD FRML MDRD: 101 ML/MIN (ref 60–?)
GLUCOSE SERPL-MCNC: 87 MG/DL (ref 70–105)
HCT VFR BLD AUTO: 28.3 % (ref 42–54)
MCH RBC QN AUTO: 25.4 PG (ref 27–33)
MCHC RBC AUTO-ENTMCNC: 30.7 G/DL (ref 32–36)
MCV RBC AUTO: 82.5 FL (ref 79–99)
NRBC BLD MANUAL-RTO: 0 % (ref 0–0.19)
PLATELET # BLD AUTO: 244 K/UL (ref 130–400)
POTASSIUM SERPL-SCNC: 4.2 MMOL/L (ref 3.5–5.1)
PROT SERPL-MCNC: 5.4 G/DL (ref 6–8.3)
RBC # BLD AUTO: 3.43 MIL/UL (ref 4.5–6.2)
SODIUM SERPL-SCNC: 135 MMOL/L (ref 136–145)
WBC # BLD AUTO: 6.2 K/UL (ref 4.8–10.8)

## 2020-11-14 PROCEDURE — 0RPL08Z REMOVAL OF SPACER FROM RIGHT ELBOW JOINT, OPEN APPROACH: ICD-10-PCS | Performed by: ORTHOPAEDIC SURGERY

## 2020-11-14 PROCEDURE — 0XBB0ZZ EXCISION OF RIGHT ELBOW REGION, OPEN APPROACH: ICD-10-PCS | Performed by: ORTHOPAEDIC SURGERY

## 2020-11-14 RX ADMIN — Medication SCH MG: at 09:00

## 2020-11-14 RX ADMIN — TAMSULOSIN HYDROCHLORIDE SCH MG: 0.4 CAPSULE ORAL at 09:00

## 2020-11-14 RX ADMIN — Medication SCH MG: at 20:54

## 2020-11-14 RX ADMIN — Medication SCH MG: at 14:00

## 2020-11-14 RX ADMIN — FAMOTIDINE SCH MG: 20 TABLET, FILM COATED ORAL at 09:00

## 2020-11-14 RX ADMIN — SODIUM CHLORIDE SCH GM: 9 INJECTION, SOLUTION INTRAVENOUS at 14:00

## 2020-11-14 RX ADMIN — CHLORTHALIDONE SCH EACH: 50 TABLET ORAL at 21:14

## 2020-11-14 RX ADMIN — TEMAZEPAM PRN MG: 15 CAPSULE ORAL at 22:21

## 2020-11-14 RX ADMIN — Medication SCH GM: at 09:00

## 2020-11-14 RX ADMIN — KETOROLAC TROMETHAMINE PRN MG: 15 INJECTION, SOLUTION INTRAMUSCULAR; INTRAVENOUS at 22:44

## 2020-11-14 RX ADMIN — SODIUM CHLORIDE SCH GM: 9 INJECTION, SOLUTION INTRAVENOUS at 05:50

## 2020-11-14 RX ADMIN — SODIUM CHLORIDE SCH GM: 9 INJECTION, SOLUTION INTRAVENOUS at 22:03

## 2020-11-14 RX ADMIN — SODIUM CHLORIDE SCH MLS/HR: 0.9 INJECTION, SOLUTION INTRAVENOUS at 22:15

## 2020-11-14 RX ADMIN — FAMOTIDINE SCH MG: 20 TABLET, FILM COATED ORAL at 20:54

## 2020-11-14 RX ADMIN — THERA TABS SCH TAB: TAB at 09:00

## 2020-11-14 RX ADMIN — KETOROLAC TROMETHAMINE PRN MG: 15 INJECTION, SOLUTION INTRAMUSCULAR; INTRAVENOUS at 03:44

## 2020-11-14 RX ADMIN — Medication SCH MG: at 05:30

## 2020-11-14 RX ADMIN — SODIUM CHLORIDE SCH MLS/HR: 0.9 INJECTION, SOLUTION INTRAVENOUS at 12:15

## 2020-11-14 RX ADMIN — CHLORTHALIDONE SCH EACH: 50 TABLET ORAL at 09:00

## 2020-11-14 RX ADMIN — HYDROCODONE BITARTRATE AND ACETAMINOPHEN PRN TAB: 5; 325 TABLET ORAL at 23:40

## 2020-11-15 VITALS — DIASTOLIC BLOOD PRESSURE: 91 MMHG | SYSTOLIC BLOOD PRESSURE: 142 MMHG

## 2020-11-15 VITALS — SYSTOLIC BLOOD PRESSURE: 158 MMHG | DIASTOLIC BLOOD PRESSURE: 105 MMHG

## 2020-11-15 VITALS — SYSTOLIC BLOOD PRESSURE: 151 MMHG | DIASTOLIC BLOOD PRESSURE: 94 MMHG

## 2020-11-15 VITALS — SYSTOLIC BLOOD PRESSURE: 158 MMHG | DIASTOLIC BLOOD PRESSURE: 97 MMHG

## 2020-11-15 VITALS — SYSTOLIC BLOOD PRESSURE: 178 MMHG | DIASTOLIC BLOOD PRESSURE: 87 MMHG

## 2020-11-15 LAB
AMMONIA PLAS-SCNC: 16 UMOL/L (ref 11–32)
BUN SERPL-MCNC: 17 MG/DL (ref 7–18)
CHLORIDE SERPL-SCNC: 103 MMOL/L (ref 101–111)
CO2 SERPL-SCNC: 18 MMOL/L (ref 21–32)
CREAT SERPL-MCNC: 0.9 MG/DL (ref 0.5–1.5)
ERYTHROCYTE [DISTWIDTH] IN BLOOD BY AUTOMATED COUNT: 17.7 % (ref 11–15.5)
GFR SERPL CREATININE-BSD FRML MDRD: 88 ML/MIN (ref 60–?)
GLUCOSE SERPL-MCNC: 137 MG/DL (ref 70–105)
HCT VFR BLD AUTO: 26.7 % (ref 42–54)
MAGNESIUM SERPL-MCNC: 1.3 MG/DL (ref 1.8–2.4)
MCH RBC QN AUTO: 26.2 PG (ref 27–33)
MCHC RBC AUTO-ENTMCNC: 31.8 G/DL (ref 32–36)
MCV RBC AUTO: 82.2 FL (ref 79–99)
NRBC BLD MANUAL-RTO: 0 % (ref 0–0.19)
PHOSPHATE SERPL-MCNC: 3.5 MG/DL (ref 2.5–4.9)
PLATELET # BLD AUTO: 311 K/UL (ref 130–400)
POTASSIUM SERPL-SCNC: 4.3 MMOL/L (ref 3.5–5.1)
RBC # BLD AUTO: 3.25 MIL/UL (ref 4.5–6.2)
SODIUM SERPL-SCNC: 134 MMOL/L (ref 136–145)
TSH SERPL DL<=0.05 MIU/L-ACNC: 2.95 UIU/ML (ref 0.36–3.74)
WBC # BLD AUTO: 9.9 K/UL (ref 4.8–10.8)

## 2020-11-15 RX ADMIN — Medication SCH MG: at 10:42

## 2020-11-15 RX ADMIN — Medication SCH MG: at 21:26

## 2020-11-15 RX ADMIN — CHLORTHALIDONE SCH EACH: 50 TABLET ORAL at 21:41

## 2020-11-15 RX ADMIN — THERA TABS SCH TAB: TAB at 09:00

## 2020-11-15 RX ADMIN — FAMOTIDINE SCH MG: 20 TABLET, FILM COATED ORAL at 10:41

## 2020-11-15 RX ADMIN — Medication SCH GM: at 10:43

## 2020-11-15 RX ADMIN — Medication SCH MG: at 10:43

## 2020-11-15 RX ADMIN — MAGNESIUM SULFATE IN WATER PRN MLS/HR: 40 INJECTION, SOLUTION INTRAVENOUS at 07:32

## 2020-11-15 RX ADMIN — Medication SCH MG: at 14:04

## 2020-11-15 RX ADMIN — FAMOTIDINE SCH MG: 20 TABLET, FILM COATED ORAL at 21:26

## 2020-11-15 RX ADMIN — HYDROCODONE BITARTRATE AND ACETAMINOPHEN PRN TAB: 5; 325 TABLET ORAL at 18:22

## 2020-11-15 RX ADMIN — CHLORTHALIDONE SCH EACH: 50 TABLET ORAL at 09:00

## 2020-11-15 RX ADMIN — SODIUM CHLORIDE SCH MLS/HR: 0.9 INJECTION, SOLUTION INTRAVENOUS at 08:15

## 2020-11-15 RX ADMIN — SODIUM CHLORIDE SCH GM: 9 INJECTION, SOLUTION INTRAVENOUS at 14:04

## 2020-11-15 RX ADMIN — APIXABAN SCH MG: 2.5 TABLET, FILM COATED ORAL at 21:26

## 2020-11-15 RX ADMIN — SODIUM CHLORIDE SCH GM: 9 INJECTION, SOLUTION INTRAVENOUS at 21:28

## 2020-11-15 RX ADMIN — HYDROCODONE BITARTRATE AND ACETAMINOPHEN PRN TAB: 5; 325 TABLET ORAL at 23:46

## 2020-11-15 RX ADMIN — Medication SCH MG: at 21:29

## 2020-11-15 RX ADMIN — Medication SCH MG: at 05:36

## 2020-11-15 RX ADMIN — APIXABAN SCH MG: 2.5 TABLET, FILM COATED ORAL at 10:41

## 2020-11-15 RX ADMIN — TAMSULOSIN HYDROCHLORIDE SCH MG: 0.4 CAPSULE ORAL at 09:00

## 2020-11-15 RX ADMIN — TEMAZEPAM PRN MG: 15 CAPSULE ORAL at 21:27

## 2020-11-15 RX ADMIN — SODIUM CHLORIDE SCH GM: 9 INJECTION, SOLUTION INTRAVENOUS at 05:36

## 2020-11-16 VITALS — DIASTOLIC BLOOD PRESSURE: 93 MMHG | SYSTOLIC BLOOD PRESSURE: 154 MMHG

## 2020-11-16 VITALS — DIASTOLIC BLOOD PRESSURE: 81 MMHG | SYSTOLIC BLOOD PRESSURE: 141 MMHG

## 2020-11-16 VITALS — SYSTOLIC BLOOD PRESSURE: 160 MMHG | DIASTOLIC BLOOD PRESSURE: 94 MMHG

## 2020-11-16 VITALS — DIASTOLIC BLOOD PRESSURE: 88 MMHG | SYSTOLIC BLOOD PRESSURE: 158 MMHG

## 2020-11-16 VITALS — DIASTOLIC BLOOD PRESSURE: 99 MMHG | SYSTOLIC BLOOD PRESSURE: 164 MMHG

## 2020-11-16 LAB
BUN SERPL-MCNC: 10 MG/DL (ref 7–18)
CHLORIDE SERPL-SCNC: 105 MMOL/L (ref 101–111)
CO2 SERPL-SCNC: 22 MMOL/L (ref 21–32)
CREAT SERPL-MCNC: 0.9 MG/DL (ref 0.5–1.5)
ERYTHROCYTE [DISTWIDTH] IN BLOOD BY AUTOMATED COUNT: 17.9 % (ref 11–15.5)
GFR SERPL CREATININE-BSD FRML MDRD: 88 ML/MIN (ref 60–?)
GLUCOSE SERPL-MCNC: 103 MG/DL (ref 70–105)
HCT VFR BLD AUTO: 27.3 % (ref 42–54)
MCH RBC QN AUTO: 26 PG (ref 27–33)
MCHC RBC AUTO-ENTMCNC: 31.5 G/DL (ref 32–36)
MCV RBC AUTO: 82.5 FL (ref 79–99)
NRBC BLD MANUAL-RTO: 0 % (ref 0–0.19)
PLATELET # BLD AUTO: 366 K/UL (ref 130–400)
POTASSIUM SERPL-SCNC: 3.8 MMOL/L (ref 3.5–5.1)
RBC # BLD AUTO: 3.31 MIL/UL (ref 4.5–6.2)
SODIUM SERPL-SCNC: 136 MMOL/L (ref 136–145)
WBC # BLD AUTO: 8.5 K/UL (ref 4.8–10.8)

## 2020-11-16 RX ADMIN — THERA TABS SCH TAB: TAB at 08:26

## 2020-11-16 RX ADMIN — Medication SCH MG: at 08:27

## 2020-11-16 RX ADMIN — HYDROCODONE BITARTRATE AND ACETAMINOPHEN PRN TAB: 5; 325 TABLET ORAL at 18:07

## 2020-11-16 RX ADMIN — Medication SCH MG: at 20:12

## 2020-11-16 RX ADMIN — Medication SCH MG: at 08:26

## 2020-11-16 RX ADMIN — APIXABAN SCH MG: 2.5 TABLET, FILM COATED ORAL at 20:12

## 2020-11-16 RX ADMIN — FAMOTIDINE SCH MG: 20 TABLET, FILM COATED ORAL at 08:27

## 2020-11-16 RX ADMIN — SODIUM CHLORIDE SCH GM: 9 INJECTION, SOLUTION INTRAVENOUS at 16:09

## 2020-11-16 RX ADMIN — Medication SCH GM: at 08:30

## 2020-11-16 RX ADMIN — TEMAZEPAM PRN MG: 15 CAPSULE ORAL at 20:13

## 2020-11-16 RX ADMIN — Medication SCH MG: at 23:20

## 2020-11-16 RX ADMIN — HYDROCODONE BITARTRATE AND ACETAMINOPHEN PRN TAB: 5; 325 TABLET ORAL at 12:42

## 2020-11-16 RX ADMIN — FAMOTIDINE SCH MG: 20 TABLET, FILM COATED ORAL at 20:12

## 2020-11-16 RX ADMIN — HYDROCODONE BITARTRATE AND ACETAMINOPHEN PRN TAB: 5; 325 TABLET ORAL at 23:24

## 2020-11-16 RX ADMIN — SODIUM CHLORIDE SCH GM: 9 INJECTION, SOLUTION INTRAVENOUS at 05:37

## 2020-11-16 RX ADMIN — Medication SCH MG: at 16:09

## 2020-11-16 RX ADMIN — HYDROCODONE BITARTRATE AND ACETAMINOPHEN PRN TAB: 5; 325 TABLET ORAL at 08:25

## 2020-11-16 RX ADMIN — Medication SCH MG: at 05:37

## 2020-11-16 RX ADMIN — CHLORTHALIDONE SCH EACH: 50 TABLET ORAL at 21:00

## 2020-11-16 RX ADMIN — CHLORTHALIDONE SCH EACH: 50 TABLET ORAL at 07:07

## 2020-11-16 RX ADMIN — TAMSULOSIN HYDROCHLORIDE SCH MG: 0.4 CAPSULE ORAL at 08:26

## 2020-11-16 RX ADMIN — APIXABAN SCH MG: 2.5 TABLET, FILM COATED ORAL at 08:27

## 2020-11-16 RX ADMIN — CHLORTHALIDONE SCH EACH: 50 TABLET ORAL at 08:31

## 2020-11-16 NOTE — NUR
THROUGHOUT THE SHIFT: PATIENT AWAKE, ALERT, ORIENTED TO NAME AND OCCASIONALLY TO PLACE. BED 
ALARM ON AS HE  REQUIRES VERY CLOSE MONITORING DUE TO FREQUENTLY GETTING OUT OF BED WITHOUT 
CALLING FOR ASSISTANCE EVEN THOUGH HE IS FREQUENTLY REMINDED OF NEED TO USE CALL BUTTON PRN. 
HE IS CONFUSED AND IS FREQUENTLY ARGUMENTATIVE TOWARDS STAFF WHEN APPROACHED TO RENDER CARE. 
HE OFTEN FORGETS HE HAS MORPHINE PCA AND IS REMINDED TO PRESS PCA BUTTON PRN PAIN. REPORT 
GIVEN TO DAY SHIFT NURSE.

## 2020-11-16 NOTE — NUR
CM Note: Omar Malone pending approval

CM met with pt and ARTURO Flanagan in room discussed MD recommendations for placement pt 
will need woundcare and abx iv, pt and MPOA agreeable, MPOA signed ELADIO per pt request to 
Omar Malone. 



Faxed order, clinicals, PAS[facility], Covid transfer form and result from 11/11 to GP, confirmation 
received. Spoke to Nithya bond/GP, will come eval pt, aware pending covid rapid if needed, 
will send result once available. Pt pending approval. Primray nurse Areli HUYNH aware. CM to 
continue to follow up.

## 2020-11-16 NOTE — NUR
CM Note: Omar Malone approval

CM spoke to Nithya bond/Omar Malone, pt has approval. Dr Arroyo updated. Primary nurse Areli HUYNH made aware. Pt safe to transfer via [facility] transport van. CM to continue to follow up.

## 2020-11-17 VITALS — SYSTOLIC BLOOD PRESSURE: 165 MMHG | DIASTOLIC BLOOD PRESSURE: 101 MMHG

## 2020-11-17 VITALS — DIASTOLIC BLOOD PRESSURE: 86 MMHG | SYSTOLIC BLOOD PRESSURE: 157 MMHG

## 2020-11-17 VITALS — DIASTOLIC BLOOD PRESSURE: 83 MMHG | SYSTOLIC BLOOD PRESSURE: 145 MMHG

## 2020-11-17 LAB
BUN SERPL-MCNC: 9 MG/DL (ref 7–18)
CHLORIDE SERPL-SCNC: 103 MMOL/L (ref 101–111)
CO2 SERPL-SCNC: 23 MMOL/L (ref 21–32)
CREAT SERPL-MCNC: 1 MG/DL (ref 0.5–1.5)
ERYTHROCYTE [DISTWIDTH] IN BLOOD BY AUTOMATED COUNT: 17.8 % (ref 11–15.5)
GFR SERPL CREATININE-BSD FRML MDRD: 78 ML/MIN (ref 60–?)
GLUCOSE SERPL-MCNC: 96 MG/DL (ref 70–105)
HCT VFR BLD AUTO: 29.3 % (ref 42–54)
MCH RBC QN AUTO: 26.1 PG (ref 27–33)
MCHC RBC AUTO-ENTMCNC: 31.4 G/DL (ref 32–36)
MCV RBC AUTO: 83.2 FL (ref 79–99)
NRBC BLD MANUAL-RTO: 0 % (ref 0–0.19)
PLATELET # BLD AUTO: 382 K/UL (ref 130–400)
POTASSIUM SERPL-SCNC: 4 MMOL/L (ref 3.5–5.1)
RBC # BLD AUTO: 3.52 MIL/UL (ref 4.5–6.2)
SODIUM SERPL-SCNC: 136 MMOL/L (ref 136–145)
WBC # BLD AUTO: 6.4 K/UL (ref 4.8–10.8)

## 2020-11-17 RX ADMIN — FAMOTIDINE SCH MG: 20 TABLET, FILM COATED ORAL at 08:17

## 2020-11-17 RX ADMIN — TAMSULOSIN HYDROCHLORIDE SCH MG: 0.4 CAPSULE ORAL at 08:17

## 2020-11-17 RX ADMIN — APIXABAN SCH MG: 2.5 TABLET, FILM COATED ORAL at 08:17

## 2020-11-17 RX ADMIN — Medication SCH MG: at 06:00

## 2020-11-17 RX ADMIN — CHLORTHALIDONE SCH EACH: 50 TABLET ORAL at 09:00

## 2020-11-17 RX ADMIN — HYDROCODONE BITARTRATE AND ACETAMINOPHEN PRN TAB: 5; 325 TABLET ORAL at 08:19

## 2020-11-17 RX ADMIN — SODIUM CHLORIDE SCH GM: 9 INJECTION, SOLUTION INTRAVENOUS at 06:00

## 2020-11-17 RX ADMIN — Medication SCH GM: at 08:17

## 2020-11-17 RX ADMIN — THERA TABS SCH TAB: TAB at 08:17

## 2020-11-17 RX ADMIN — Medication SCH MG: at 13:20

## 2020-11-17 RX ADMIN — Medication SCH MG: at 08:17

## 2020-11-17 RX ADMIN — HYDROCODONE BITARTRATE AND ACETAMINOPHEN PRN TAB: 5; 325 TABLET ORAL at 13:54

## 2020-11-17 RX ADMIN — SODIUM CHLORIDE SCH GM: 9 INJECTION, SOLUTION INTRAVENOUS at 00:11

## 2020-11-17 RX ADMIN — SODIUM CHLORIDE SCH GM: 9 INJECTION, SOLUTION INTRAVENOUS at 13:19

## 2020-11-17 RX ADMIN — HYDROCODONE BITARTRATE AND ACETAMINOPHEN PRN TAB: 5; 325 TABLET ORAL at 03:42

## 2020-11-17 NOTE — NUR
DISCHARGE

PATIENT GIVEN DISCHARGE INSTRUCTIONS VIA TEACH BACK. PATIENT TO BE TRANSFERRED TO [facility] BY (TRANSPORTER) CROTES. REPORT GIVEN TO HUMBERTO BEAN. PICC LINE IN PLACE, LINES PATENT. 
PICC LINE DRESSING TO BE CHANGED ON 11/19/20. PATIENT TO CONTINUE WITH ANTIBIOTIC, 
ANTICOAGULANT TREATMENT AND REHAB SERVICES. RIGHT ARM TO STAY IN SLING WHILE AMBULATING. 
PATIENT STABLE AT THIS TIME. FIANCE AT BEDSIDE. TRANSPORTER PRESENT TO TRANSFER PATIENT AT 
THIS TIME.